# Patient Record
Sex: MALE | Race: WHITE | URBAN - METROPOLITAN AREA
[De-identification: names, ages, dates, MRNs, and addresses within clinical notes are randomized per-mention and may not be internally consistent; named-entity substitution may affect disease eponyms.]

---

## 2021-12-02 ENCOUNTER — APPOINTMENT (OUTPATIENT)
Dept: GENERAL RADIOLOGY | Age: 26
End: 2021-12-02
Payer: COMMERCIAL

## 2021-12-02 ENCOUNTER — HOSPITAL ENCOUNTER (EMERGENCY)
Age: 26
Discharge: HOME | End: 2021-12-02
Payer: COMMERCIAL

## 2021-12-02 VITALS
DIASTOLIC BLOOD PRESSURE: 67 MMHG | HEIGHT: 70 IN | BODY MASS INDEX: 21.11 KG/M2 | HEART RATE: 92 BPM | RESPIRATION RATE: 20 BRPM | OXYGEN SATURATION: 97 % | TEMPERATURE: 99.3 F | SYSTOLIC BLOOD PRESSURE: 144 MMHG | WEIGHT: 147.49 LBS

## 2021-12-02 DIAGNOSIS — S69.91XA RIGHT WRIST INJURY, INITIAL ENCOUNTER: Primary | ICD-10-CM

## 2021-12-02 PROCEDURE — 99283 EMERGENCY DEPT VISIT LOW MDM: CPT

## 2021-12-02 PROCEDURE — 73110 X-RAY EXAM OF WRIST: CPT

## 2021-12-02 RX ORDER — NAPROXEN 500 MG/1
500 TABLET ORAL 2 TIMES DAILY WITH MEALS
Qty: 30 TABLET | Refills: 0 | Status: SHIPPED | OUTPATIENT
Start: 2021-12-02 | End: 2022-03-07 | Stop reason: ALTCHOICE

## 2021-12-02 ASSESSMENT — PAIN DESCRIPTION - PAIN TYPE: TYPE: ACUTE PAIN

## 2021-12-02 ASSESSMENT — PAIN - FUNCTIONAL ASSESSMENT: PAIN_FUNCTIONAL_ASSESSMENT: 0-10

## 2021-12-02 ASSESSMENT — ENCOUNTER SYMPTOMS: GASTROINTESTINAL NEGATIVE: 1

## 2021-12-02 ASSESSMENT — PAIN DESCRIPTION - ORIENTATION
ORIENTATION: RIGHT
ORIENTATION: RIGHT

## 2021-12-02 ASSESSMENT — PAIN DESCRIPTION - FREQUENCY: FREQUENCY: CONTINUOUS

## 2021-12-02 ASSESSMENT — PAIN SCALES - GENERAL
PAINLEVEL_OUTOF10: 3
PAINLEVEL_OUTOF10: 6

## 2021-12-02 ASSESSMENT — PAIN DESCRIPTION - DESCRIPTORS
DESCRIPTORS: ACHING
DESCRIPTORS: ACHING;CONSTANT

## 2021-12-02 ASSESSMENT — PAIN DESCRIPTION - LOCATION
LOCATION: WRIST
LOCATION: WRIST

## 2021-12-02 NOTE — Clinical Note
Lilliana Zapata was seen and treated in our emergency department on 12/2/2021. He may return to work on 12/03/2021. No work involving her right hand for couple days or cleared by orthopedic/follow-up specialist.     If you have any questions or concerns, please don't hesitate to call.       [address] [place], [place]

## 2021-12-02 NOTE — Clinical Note
Ji De La Cruz was seen and treated in our emergency department on 12/2/2021. He may return to work on 12/03/2021. No work involving her right hand for couple days or cleared by orthopedic/follow-up specialist.     If you have any questions or concerns, please don't hesitate to call.       [address] [place], [place]

## 2021-12-03 NOTE — ED PROVIDER NOTES
[address]  [address] [address]  Dept: [phone]  Loc: [phone]  eMERGENCYdEPARTMENT eNCOUnter      Pt Name: Bautista Cruz  MRN: [id number]  Mary Jane 1995  Date of evaluation: 12/2/2021  Provider:Irene Arroyo PA-C    CHIEF COMPLAINT       Chief Complaint   Patient presents with    Wrist Injury     right; got pushed back between a work cart and a post this afternoon       Kailash Bhandari 124 time was 0 minutes, excluding separately reportable procedures. There was a high probability of clinically significant/life threatening deterioration in the patient's condition which required my urgentintervention. HISTORY OF PRESENT ILLNESS  (Location/Symptom, Timing/Onset, Context/Setting, Quality, Duration,Modifying Factors, Severity.)   Bautista Cruz is a 23 y.o. male who presents to the emergency department by private vehicle complaining of right wrist injury. Patient was at work when his right wrist got jammed while pushing a cart. He was pushing the cart and his elbow hit a pole. This caused his right wrist to hyperflex. Since then he has had pain throughout wrist. Pain worsens with movement of wrist. Denies any numbness or tingling to digits. Pain rated a 3/10 severity. Given injury sought evaluation in the ED. No other complaints at this time. He is right-hand dominant. Nursing Notes were reviewedand agreed with or any disagreements were addressed in the HPI. REVIEW OF SYSTEMS    (2-9 systems for level 4, 10 or more for level 5)     Review of Systems   Constitutional: Negative for chills and fever. HENT: Negative. Gastrointestinal: Negative. Genitourinary: Negative. Musculoskeletal: Positive for arthralgias. Skin: Negative. Neurological: Negative. Psychiatric/Behavioral: Negative for behavioral problems and confusion.        Except as noted above the remainder of the review of systems was reviewed and negative. PAST MEDICAL HISTORY   History reviewed. No pertinent past medical history. SURGICAL HISTORY     History reviewed. No pertinent surgical history. CURRENT MEDICATIONS     [unfilled]    ALLERGIES     Patient has no known allergies. FAMILY HISTORY     History reviewed. No pertinent family history. No family status information on file. SOCIAL HISTORY      reports that he has never smoked. He has never used smokeless tobacco. He reports that he does not drink alcohol. PHYSICAL EXAM    (up to 7 for level 4, 8 or more for level 5)     ED Triage Vitals [12/02/21 2028]   Enc Vitals Group      BP (!) 144/67      Pulse 92      Resp 20      Temp 99.3 °F (37.4 °C)      Temp Source Temporal      SpO2 97 %      Weight 147 lb 7.8 oz (66.9 kg)      Height 5' 10\" (1.778 m)      Head Circumference       Peak Flow       Pain Score       Pain Loc       Pain Edu? Excl. in [address]? Physical Exam  Vitals reviewed. Constitutional:       Appearance: Normal appearance. HENT:      Head: Normocephalic and atraumatic. Pulmonary:      Effort: Pulmonary effort is normal. No respiratory distress. Musculoskeletal:      Cervical back: Normal range of motion and neck supple. Comments: RUE: Tender to palpation along dorsal aspect of the right wrist. No focal bony tenderness throughout, no snuffbox tenderness. Pain with active flexion, flexion limited secondary to pain, mild pain with active extension. Full range of motion of all 5 digits, no weakness to digits throughout. Sensation tact distally, cap refill less than 3 seconds, compartments soft and nontender throughout. No other focal tenderness throughout extremity   Skin:     General: Skin is warm. Neurological:      General: No focal deficit present. Mental Status: He is alert and oriented to person, place, and time.    Psychiatric:         Mood and Affect: Mood normal.         Behavior: Behavior normal.           DIAGNOSTIC RESULTS     EKG: All EKG's are interpreted by the Emergency Department Physician who either signs or Co-signs this chart in the absence of a cardiologist.    RADIOLOGY:   Non-plain film images such as CT, Ultrasound and MRI are read by the radiologist. Plain radiographic images are visualized and preliminarilyinterpreted by the emergency physician with the below findings:    Interpretation per the Radiologist below,if available at the time of this note:    XR WRIST RIGHT (MIN 3 VIEWS)   Final Result   No acute osseous abnormality in the right wrist.               LABS:  Labs Reviewed - No data to display    All other labs were within normal range or not returned as of this dictation. EMERGENCY DEPARTMENT COURSE and DIFFERENTIAL DIAGNOSIS/MDM:   Vitals:    Vitals:    12/02/21 2028   BP: (!) 144/67   Pulse: 92   Resp: 20   Temp: 99.3 °F (37.4 °C)   TempSrc: Temporal   SpO2: 97%   Weight: 147 lb 7.8 oz (66.9 kg)   Height: 5' 10\" (1.778 m)       MDM     Patient presents ED with HPI noted above. X-ray obtained. X-ray showed no acute osseous abnormality. Suspect sprain. He is neurovascular tact distally. Patient placed in Velcro splint for support and comfort. He was provided [facility] also shows for work-related injury follow-up. He has prior note for work. Told to rest, ice, elevate and take naproxen as needed for pain inflammation. He is comfortable with plan. He was discharged home in stable condition. The patient tolerated their visit well. I saw the patient independently with physician available for consultation as needed. I have discussed the findings of today's workup with the patient and addressed the patient's questions and concerns. Important warning signs as well as new or worsening symptoms which would necessitate immediate return to the ED were discussed. The plan is to discharge from the ED at this time, and the patient is in stable condition.  The patient acknowledged understanding is agreeable with this plan. CONSULTS:  None    PROCEDURES:  Procedures    FINAL IMPRESSION      1. Right wrist injury, initial encounter          DISPOSITION/PLAN   [unfilled]    PATIENT REFERRED TO:  [facility] Emergency Department  [address] [address]  [phone]  Go to   If symptoms worsen    *[address] [place] [address] [address]  [phone]    Call in 1 day  For follow up and reevaluation regarding work related injury.       DISCHARGE MEDICATIONS:  Discharge Medication List as of 12/2/2021  9:01 PM      START taking these medications    Details   naproxen (NAPROSYN) 500 MG tablet Take 1 tablet by mouth 2 times daily (with meals), Disp-30 tablet, R-0Print             (Please note that portions of this note were completed with a voice recognition program.  Efforts were made to edit the dictations but occasionally words are mis-transcribed.)    TRI [place], [place]  12/02/21 [address] PA-C  12/02/21 [id number]

## 2022-03-07 ENCOUNTER — OFFICE VISIT (OUTPATIENT)
Dept: INTERNAL MEDICINE CLINIC | Age: 27
End: 2022-03-07
Payer: COMMERCIAL

## 2022-03-07 VITALS
OXYGEN SATURATION: 97 % | BODY MASS INDEX: 20.16 KG/M2 | DIASTOLIC BLOOD PRESSURE: 80 MMHG | SYSTOLIC BLOOD PRESSURE: 118 MMHG | HEART RATE: 130 BPM | TEMPERATURE: 98.5 F | WEIGHT: 140.5 LBS

## 2022-03-07 DIAGNOSIS — Z72.0 TOBACCO ABUSE: ICD-10-CM

## 2022-03-07 DIAGNOSIS — Z76.89 ENCOUNTER TO ESTABLISH CARE: Primary | ICD-10-CM

## 2022-03-07 DIAGNOSIS — Z00.00 HEALTHCARE MAINTENANCE: ICD-10-CM

## 2022-03-07 DIAGNOSIS — F39 MOOD DISORDER (HCC): ICD-10-CM

## 2022-03-07 DIAGNOSIS — Z23 FLU VACCINE NEED: ICD-10-CM

## 2022-03-07 LAB
A/G RATIO: 1.9 (ref 1.1–2.2)
ALBUMIN SERPL-MCNC: 5.4 G/DL (ref 3.4–5)
ALP BLD-CCNC: 57 U/L (ref 40–129)
ALT SERPL-CCNC: 13 U/L (ref 10–40)
ANION GAP SERPL CALCULATED.3IONS-SCNC: 14 MMOL/L (ref 3–16)
AST SERPL-CCNC: 12 U/L (ref 15–37)
BASOPHILS ABSOLUTE: 0 K/UL (ref 0–0.2)
BASOPHILS RELATIVE PERCENT: 0.4 %
BILIRUB SERPL-MCNC: 0.9 MG/DL (ref 0–1)
BUN BLDV-MCNC: 16 MG/DL (ref 7–20)
CALCIUM SERPL-MCNC: 10.5 MG/DL (ref 8.3–10.6)
CHLORIDE BLD-SCNC: 98 MMOL/L (ref 99–110)
CO2: 25 MMOL/L (ref 21–32)
CREAT SERPL-MCNC: 1 MG/DL (ref 0.9–1.3)
EOSINOPHILS ABSOLUTE: 0.1 K/UL (ref 0–0.6)
EOSINOPHILS RELATIVE PERCENT: 1 %
GFR AFRICAN AMERICAN: >60
GFR NON-AFRICAN AMERICAN: >60
GLUCOSE BLD-MCNC: 92 MG/DL (ref 70–99)
HCT VFR BLD CALC: 46.2 % (ref 40.5–52.5)
HEMOGLOBIN: 15.1 G/DL (ref 13.5–17.5)
HEPATITIS C ANTIBODY INTERPRETATION: NORMAL
LYMPHOCYTES ABSOLUTE: 1.7 K/UL (ref 1–5.1)
LYMPHOCYTES RELATIVE PERCENT: 33.3 %
MCH RBC QN AUTO: 30.4 PG (ref 26–34)
MCHC RBC AUTO-ENTMCNC: 32.8 G/DL (ref 31–36)
MCV RBC AUTO: 92.8 FL (ref 80–100)
MONOCYTES ABSOLUTE: 0.4 K/UL (ref 0–1.3)
MONOCYTES RELATIVE PERCENT: 8.3 %
NEUTROPHILS ABSOLUTE: 3 K/UL (ref 1.7–7.7)
NEUTROPHILS RELATIVE PERCENT: 57 %
PDW BLD-RTO: 13.6 % (ref 12.4–15.4)
PLATELET # BLD: 219 K/UL (ref 135–450)
PMV BLD AUTO: 8.9 FL (ref 5–10.5)
POTASSIUM SERPL-SCNC: 5.5 MMOL/L (ref 3.5–5.1)
RBC # BLD: 4.98 M/UL (ref 4.2–5.9)
SODIUM BLD-SCNC: 137 MMOL/L (ref 136–145)
TOTAL PROTEIN: 8.2 G/DL (ref 6.4–8.2)
TSH REFLEX: 1.22 UIU/ML (ref 0.27–4.2)
WBC # BLD: 5.3 K/UL (ref 4–11)

## 2022-03-07 PROCEDURE — G8420 CALC BMI NORM PARAMETERS: HCPCS | Performed by: NURSE PRACTITIONER

## 2022-03-07 PROCEDURE — 90674 CCIIV4 VAC NO PRSV 0.5 ML IM: CPT | Performed by: NURSE PRACTITIONER

## 2022-03-07 PROCEDURE — G8482 FLU IMMUNIZE ORDER/ADMIN: HCPCS | Performed by: NURSE PRACTITIONER

## 2022-03-07 PROCEDURE — 99203 OFFICE O/P NEW LOW 30 MIN: CPT | Performed by: NURSE PRACTITIONER

## 2022-03-07 PROCEDURE — G8427 DOCREV CUR MEDS BY ELIG CLIN: HCPCS | Performed by: NURSE PRACTITIONER

## 2022-03-07 PROCEDURE — 4004F PT TOBACCO SCREEN RCVD TLK: CPT | Performed by: NURSE PRACTITIONER

## 2022-03-07 PROCEDURE — 36415 COLL VENOUS BLD VENIPUNCTURE: CPT | Performed by: NURSE PRACTITIONER

## 2022-03-07 ASSESSMENT — PATIENT HEALTH QUESTIONNAIRE - PHQ9
1. LITTLE INTEREST OR PLEASURE IN DOING THINGS: 1
2. FEELING DOWN, DEPRESSED OR HOPELESS: 0
SUM OF ALL RESPONSES TO PHQ QUESTIONS 1-9: 1
SUM OF ALL RESPONSES TO PHQ9 QUESTIONS 1 & 2: 1

## 2022-03-07 ASSESSMENT — ENCOUNTER SYMPTOMS
COUGH: 0
SHORTNESS OF BREATH: 0

## 2022-03-07 NOTE — PROGRESS NOTES
Date: 3/7/2022                                               Subjective/Objective:     Chief Complaint   Patient presents with   [place] Establish Care     Would like [facility] to [facility]     Lalo Candelario is a 32 yo male, visit today to establish care. No recent PCP. Patient is requesting referral for psychiatry today. Reports that lately he has had issues with stability. He reports that he has a \" really good week,\" meaning he is very productive and then he will have 1 bad day that there is everything off. Reports he was working at [employer] in [facility]. He lost his job indirectly related to his mental health. In regards to his sleep he says that he \"enjoys spells of thorough rest in between sleep is hit and miss. \"  He reports that he was previously treated for mental health issues. He is not clear on his history of this, reports he \"was not told much. \"  He believes he may have been diagnosed as bipolar, ADHD. He does have 1 psychiatric visit visible in [address] with a noted history of depression. He denies SI currently. He tells me that \"mortality weighs heavy on a oziel he thinks about death ,\" however again he denies thoughts of harming himself, denies homicidal thoughts. He reports that as a child he would have periods where he would stop eating and drinking liquids. Denies other suicide attempts. He is not sure if he has had visual or auditory hallucinations. He smokes marijuana few times per week, denies other drug use. He drinks alcohol 3-4 times per year. He tells me that he does have guns in his home. Smokes cigarettes some days, anywhere from 3-1/2 pack when he does. Is not ready to quit. There are no problems to display for this patient.       Past Medical History:   Diagnosis Date    Mental health disorder        Past Surgical History:   Procedure Laterality Date    OTHER SURGICAL HISTORY      in grown nail removal       No results found for any previous visit. Family History   Problem Relation Age of Onset    Bipolar Disorder Father     ADHD Brother        No current outpatient medications on file. No current facility-administered medications for this visit. No Known Allergies    Review of Systems   Constitutional: Negative for chills and fever. Respiratory: Negative for cough and shortness of breath. Cardiovascular: Negative for chest pain and leg swelling. Genitourinary: Negative for difficulty urinating. Neurological: Negative for dizziness and syncope. Psychiatric/Behavioral: Positive for dysphoric mood and sleep disturbance. Negative for agitation and suicidal ideas. The patient is not nervous/anxious. All other systems reviewed and are negative. Vitals:  /80   Pulse 130   Temp 98.5 °F (36.9 °C) (Oral)   Wt 140 lb 8 oz (63.7 kg)   SpO2 97%   BMI 20.16 kg/m²     Physical Exam  Vitals reviewed. Constitutional:       General: He is not in acute distress. Appearance: Normal appearance. HENT:      Head: Normocephalic and atraumatic. Right Ear: Tympanic membrane, ear canal and external ear normal.      Left Ear: Tympanic membrane, ear canal and external ear normal.   Cardiovascular:      Rate and Rhythm: Normal rate and regular rhythm. Pulses: Normal pulses. Heart sounds: Normal heart sounds. Pulmonary:      Effort: Pulmonary effort is normal.      Breath sounds: Normal breath sounds. Abdominal:      General: Bowel sounds are normal.      Palpations: Abdomen is soft. Tenderness: There is no abdominal tenderness. Lymphadenopathy:      Cervical: No cervical adenopathy. Skin:     General: Skin is warm and dry. Neurological:      General: No focal deficit present. Mental Status: He is alert and oriented to person, place, and time. Psychiatric:         Mood and Affect: Mood is not depressed. Thought Content:  Thought content does not include homicidal or suicidal ideation. Judgment: Judgment normal.         Assessment/Plan     1. Encounter to establish care    2. Mood disorder [facility]): not currently treated  - External Referral to Psychiatry  - External Referral To Psychology   -Denies SI   -Reviewed with patient if he ever were to have SI he needs to call 911 immediately   -He was strongly encouraged to have guns removed from his home. I asked if I could call someone to have them removed and he declined for me to do so. -See psychiatry and psychology    3. Tobacco abuse: Cessation strongly advised. Patient not ready to quit. 4. Flu vaccine need  - INFLUENZA, MDCK QUADV, 2 YRS AND OLDER, IM, PF, PREFILL SYR OR SDV, 0.5ML (FLUCELVAX QUADV, PF)    5. Healthcare maintenance  -Age-appropriate health screening, patient agreeable to HIV and hepatitis C testing  - Comprehensive Metabolic Panel  - CBC with Auto Differential  - TSH with Reflex; Future  - HIV Screen; Future  - Hepatitis C Antibody;  Future      Orders Placed This Encounter   Procedures    INFLUENZA, MDCK QUADV, 2 YRS AND OLDER, IM, PF, PREFILL SYR OR SDV, 0.5ML (FLUCELVAX QUADV, PF)    Comprehensive Metabolic Panel    CBC with Auto Differential    TSH with Reflex     Standing Status:   Future     Number of Occurrences:   1     Standing Expiration Date:   3/7/2023    HIV Screen     Standing Status:   Future     Number of Occurrences:   1     Standing Expiration Date:   3/7/2023    Hepatitis C Antibody     Standing Status:   Future     Number of Occurrences:   1     Standing Expiration Date:   3/7/2023    External Referral to Psychiatry     Referral Priority:   Routine     Referral Type:   Eval and Treat     Referral Reason:   Specialty Services Required     Requested Specialty:   Psychiatry     Number of Visits Requested:   1    External Referral To Psychology     Referral Priority:   Routine     Referral Type:   Eval and Treat     Referral Reason:   Specialty Services Required Requested Specialty:   Psychology     Number of Visits Requested:   1       No follow-ups on file. OR sooner with questions, concerns, worsening symptoms    OSCAR CORDOVA, NP    3/7/2022  3:37 PM    Discussed use, benefit, and side effects of prescribed medications. Barriers to medication compliance addressed. Discussed all ordered testing and labs. All patient questions answered. Patient agreeable with plan above. Please note that this chart was generated using dragon dictation software. Although every effort was made to ensure the accuracy of this automated transcription, some errors in transcription may have occurred.

## 2022-03-08 DIAGNOSIS — E87.5 HYPERKALEMIA: Primary | ICD-10-CM

## 2022-03-08 LAB
HIV AG/AB: NORMAL
HIV ANTIGEN: NORMAL
HIV-1 ANTIBODY: NORMAL
HIV-2 AB: NORMAL

## 2022-03-18 ENCOUNTER — OFFICE VISIT (OUTPATIENT)
Dept: PSYCHOLOGY | Age: 27
End: 2022-03-18
Payer: COMMERCIAL

## 2022-03-18 DIAGNOSIS — F33.1 MAJOR DEPRESSIVE DISORDER, RECURRENT EPISODE, MODERATE (HCC): ICD-10-CM

## 2022-03-18 DIAGNOSIS — F34.9 PERSISTENT MOOD (AFFECTIVE) DISORDER, UNSPECIFIED (HCC): Primary | ICD-10-CM

## 2022-03-18 PROCEDURE — 90791 PSYCH DIAGNOSTIC EVALUATION: CPT | Performed by: PSYCHOLOGIST

## 2022-03-18 PROCEDURE — 4004F PT TOBACCO SCREEN RCVD TLK: CPT | Performed by: PSYCHOLOGIST

## 2022-03-18 ASSESSMENT — PATIENT HEALTH QUESTIONNAIRE - PHQ9
1. LITTLE INTEREST OR PLEASURE IN DOING THINGS: 3
SUM OF ALL RESPONSES TO PHQ QUESTIONS 1-9: 18
6. FEELING BAD ABOUT YOURSELF - OR THAT YOU ARE A FAILURE OR HAVE LET YOURSELF OR YOUR FAMILY DOWN: 0
9. THOUGHTS THAT YOU WOULD BE BETTER OFF DEAD, OR OF HURTING YOURSELF: 0
7. TROUBLE CONCENTRATING ON THINGS, SUCH AS READING THE NEWSPAPER OR WATCHING TELEVISION: 2
SUM OF ALL RESPONSES TO PHQ9 QUESTIONS 1 & 2: 5
3. TROUBLE FALLING OR STAYING ASLEEP: 3
5. POOR APPETITE OR OVEREATING: 3
2. FEELING DOWN, DEPRESSED OR HOPELESS: 2
SUM OF ALL RESPONSES TO PHQ QUESTIONS 1-9: 18
8. MOVING OR SPEAKING SO SLOWLY THAT OTHER PEOPLE COULD HAVE NOTICED. OR THE OPPOSITE, BEING SO FIGETY OR RESTLESS THAT YOU HAVE BEEN MOVING AROUND A LOT MORE THAN USUAL: 2
4. FEELING TIRED OR HAVING LITTLE ENERGY: 3

## 2022-03-18 ASSESSMENT — ANXIETY QUESTIONNAIRES
3. WORRYING TOO MUCH ABOUT DIFFERENT THINGS: 1-SEVERAL DAYS
6. BECOMING EASILY ANNOYED OR IRRITABLE: 3-NEARLY EVERY DAY
1. FEELING NERVOUS, ANXIOUS, OR ON EDGE: 3-NEARLY EVERY DAY
GAD7 TOTAL SCORE: 13
4. TROUBLE RELAXING: 3-NEARLY EVERY DAY
2. NOT BEING ABLE TO STOP OR CONTROL WORRYING: 1-SEVERAL DAYS
5. BEING SO RESTLESS THAT IT IS HARD TO SIT STILL: 2-OVER HALF THE DAYS
7. FEELING AFRAID AS IF SOMETHING AWFUL MIGHT HAPPEN: 0-NOT AT ALL

## 2022-03-18 NOTE — PROGRESS NOTES
Behavioral Health Consultation  Cesar Mesa, Ph.D.  Psychologist  3/18/2022  2:30 PM EDT      Time spent with Patient: 30 minutes  This is patient's first [facility] appointment. Reason for Consult: depression  Referring Provider: Sal Lewis, [address] [facility] [address] [facility] [address]    Feedback for PCP:     Pt provided informed consent for the behavioral health program. Discussed with patient model of service to include the limits of confidentiality (i.e. abuse reporting, suicide intervention, etc.) and short-term intervention focused approach. Pt indicated understanding. Feedback given to PCP. S:  When asked how he was feeling about having this visit, he said that he was [place] Older" that the people who care about him wants him to get help. He said, \"What I feel does not feel like talk therapy can fix. \" He said, \"I'm visibly depressed and they say I get in my head too much, and that I isolate. \" He said that the last job he had was in [employer] but that he was discharged after a year. 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 He said he got a \"Dear Domingo\" letter while in [employer] as well. When asked what he saw himself doing in 5 years, he said \"blank,\" and gave the same answer when asked about in one month. He did say though, that \"I would like a mate and children. \"    SI/HI: reports a suicide attempt by discontinuing \"eating and drinking. \"  Mental health history:     Social History     Tobacco Use    Smoking status: Current Some Day Smoker    Smokeless tobacco: Never Used   Substance Use Topics    Alcohol use: Yes        Illicit drugs:   Social History     Substance and Sexual Activity   Drug Use Yes    Types: Marijuana Gelene Chasten)        O:  MSE:  Appearance: good hygiene   Attitude: cooperative and friendly  Consciousness: alert  Orientation: oriented to person, place, time, general circumstance  Memory: recent and remote memory intact  Attention/Concentration: intact during session  Psychomotor Activity: normal  Eye Contact: normal  Speech: rapid and pressured  Mood: seemed hypomanic, but positive mood  Affect: congruent  Perception: within normal limits  Thought Content: within normal limits  Thought Process: logical, coherent and goal-directed  Insight: good  Judgment: intact  Ability to understand instructions: Yes  Ability to respond meaningfully: Yes  Morbid Ideation: no   Suicide Assessment: suicidal ideation without plan or intent  Homicidal Ideation: no    A:  Still collecting clinical information with which to make a behavioral change plan. He was asked if it wasn't his idea to be here, it's not likely that this would be a good use of time for him or me. He was told that I would be happy to work with him, and when asked what he would like to do, he said, \"You passed my test.\" He did not say anything more about what the test was comprised of, but he said that he would make a F/U appointment. ALEJO 7 SCORE 3/18/2022   ALEJO-7 Total Score 13     Interpretation of ALEJO-7 score: 5-9 = mild anxiety, 10-14 = moderate anxiety, 15+ = severe anxiety. Recommend referral to behavioral health for scores 10 or greater. PHQ Scores 3/18/2022 3/7/2022   PHQ2 Score 5 1   PHQ9 Score 18 1     Interpretation of Total Score Depression Severity: 1-4 = Minimal depression, 5-9 = Mild depression, 10-14 = Moderate depression, 15-19 = Moderately severe depression, 20-27 = Severe depression    Diagnosis:    1. Persistent mood (affective) disorder, unspecified ([facility] [address]    2. Major depressive disorder, recurrent episode, moderate (HCC)        There is no problem list on file for this patient. Plan:  Pt interventions:  Established rapport, [place]-setting to identify pt's primary goals for [facility] visit / overall health, Supportive techniques and Provided Psychoeducation re: treatment of depression.        Pt Behavioral Change Plan:  Pt set the following goals:  Pt scheduled F/U visit in three weeks

## 2022-04-08 ENCOUNTER — OFFICE VISIT (OUTPATIENT)
Dept: PSYCHOLOGY | Age: 27
End: 2022-04-08
Payer: COMMERCIAL

## 2022-04-08 DIAGNOSIS — F33.2 SEVERE EPISODE OF RECURRENT MAJOR DEPRESSIVE DISORDER, WITHOUT PSYCHOTIC FEATURES (HCC): Primary | ICD-10-CM

## 2022-04-08 PROCEDURE — 4004F PT TOBACCO SCREEN RCVD TLK: CPT | Performed by: PSYCHOLOGIST

## 2022-04-08 PROCEDURE — 90832 PSYTX W PT 30 MINUTES: CPT | Performed by: PSYCHOLOGIST

## 2022-04-08 ASSESSMENT — ANXIETY QUESTIONNAIRES
6. BECOMING EASILY ANNOYED OR IRRITABLE: 3-NEARLY EVERY DAY
GAD7 TOTAL SCORE: 15
2. NOT BEING ABLE TO STOP OR CONTROL WORRYING: 1-SEVERAL DAYS
7. FEELING AFRAID AS IF SOMETHING AWFUL MIGHT HAPPEN: 1-SEVERAL DAYS
3. WORRYING TOO MUCH ABOUT DIFFERENT THINGS: 2-OVER HALF THE DAYS
5. BEING SO RESTLESS THAT IT IS HARD TO SIT STILL: 2-OVER HALF THE DAYS
4. TROUBLE RELAXING: 3-NEARLY EVERY DAY
1. FEELING NERVOUS, ANXIOUS, OR ON EDGE: 3-NEARLY EVERY DAY

## 2022-04-08 ASSESSMENT — PATIENT HEALTH QUESTIONNAIRE - PHQ9
SUM OF ALL RESPONSES TO PHQ QUESTIONS 1-9: 19
3. TROUBLE FALLING OR STAYING ASLEEP: 3
4. FEELING TIRED OR HAVING LITTLE ENERGY: 3
9. THOUGHTS THAT YOU WOULD BE BETTER OFF DEAD, OR OF HURTING YOURSELF: 0
8. MOVING OR SPEAKING SO SLOWLY THAT OTHER PEOPLE COULD HAVE NOTICED. OR THE OPPOSITE, BEING SO FIGETY OR RESTLESS THAT YOU HAVE BEEN MOVING AROUND A LOT MORE THAN USUAL: 1
SUM OF ALL RESPONSES TO PHQ QUESTIONS 1-9: 19
SUM OF ALL RESPONSES TO PHQ QUESTIONS 1-9: 19
6. FEELING BAD ABOUT YOURSELF - OR THAT YOU ARE A FAILURE OR HAVE LET YOURSELF OR YOUR FAMILY DOWN: 1
5. POOR APPETITE OR OVEREATING: 3
SUM OF ALL RESPONSES TO PHQ QUESTIONS 1-9: 19
2. FEELING DOWN, DEPRESSED OR HOPELESS: 2
7. TROUBLE CONCENTRATING ON THINGS, SUCH AS READING THE NEWSPAPER OR WATCHING TELEVISION: 3
1. LITTLE INTEREST OR PLEASURE IN DOING THINGS: 3
SUM OF ALL RESPONSES TO PHQ9 QUESTIONS 1 & 2: 5
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
## 2022-04-15 ENCOUNTER — OFFICE VISIT (OUTPATIENT)
Dept: PSYCHOLOGY | Age: 27
End: 2022-04-15
Payer: COMMERCIAL

## 2022-04-15 DIAGNOSIS — F33.2 SEVERE EPISODE OF RECURRENT MAJOR DEPRESSIVE DISORDER, WITHOUT PSYCHOTIC FEATURES (HCC): Primary | ICD-10-CM

## 2022-04-15 PROCEDURE — 4004F PT TOBACCO SCREEN RCVD TLK: CPT | Performed by: PSYCHOLOGIST

## 2022-04-15 PROCEDURE — 90832 PSYTX W PT 30 MINUTES: CPT | Performed by: PSYCHOLOGIST

## 2022-04-15 ASSESSMENT — PATIENT HEALTH QUESTIONNAIRE - PHQ9
SUM OF ALL RESPONSES TO PHQ QUESTIONS 1-9: 16
SUM OF ALL RESPONSES TO PHQ9 QUESTIONS 1 & 2: 5
2. FEELING DOWN, DEPRESSED OR HOPELESS: 2
4. FEELING TIRED OR HAVING LITTLE ENERGY: 2
SUM OF ALL RESPONSES TO PHQ QUESTIONS 1-9: 16
5. POOR APPETITE OR OVEREATING: 3
6. FEELING BAD ABOUT YOURSELF - OR THAT YOU ARE A FAILURE OR HAVE LET YOURSELF OR YOUR FAMILY DOWN: 1
SUM OF ALL RESPONSES TO PHQ QUESTIONS 1-9: 16
SUM OF ALL RESPONSES TO PHQ QUESTIONS 1-9: 16
9. THOUGHTS THAT YOU WOULD BE BETTER OFF DEAD, OR OF HURTING YOURSELF: 0
1. LITTLE INTEREST OR PLEASURE IN DOING THINGS: 3
7. TROUBLE CONCENTRATING ON THINGS, SUCH AS READING THE NEWSPAPER OR WATCHING TELEVISION: 2
3. TROUBLE FALLING OR STAYING ASLEEP: 3
8. MOVING OR SPEAKING SO SLOWLY THAT OTHER PEOPLE COULD HAVE NOTICED. OR THE OPPOSITE, BEING SO FIGETY OR RESTLESS THAT YOU HAVE BEEN MOVING AROUND A LOT MORE THAN USUAL: 0

## 2022-04-15 NOTE — PROGRESS NOTES
Behavioral Health Consultation  Candace Haij, Ph.D.  Psychologist  4/15/2022  11:30 AM EDT      Time spent with Patient: 30 minutes  This is patient's third [facility] appointment. Reason for Consult: depression  Referring Provider: Terrence Chamorro, [address] [facility] [address] [place] [id number]    Feedback for PCP:     Pt provided informed consent for the behavioral health program. Discussed with patient model of service to include the limits of confidentiality (i.e. abuse reporting, suicide intervention, etc.) and short-term intervention focused approach. Pt indicated understanding. Feedback given to PCP. S:  The patient reports \"I've been on the up for several days. \" He said that the relief has been \"physiologically relieving. \" When asked what 'up' was referring to, he said that his \"demeanor\" is more positive. He talked about how the concept of radical acceptance might help dilute the influence of the \"pool of toxicity. \" He described how his circadian rhythm shifts from time to time, which adds to his anxiety at times. SI/HI: reports a suicide attempt by discontinuing \"eating and drinking. \"  Mental health history:     Social History     Tobacco Use    Smoking status: Current Some Day Smoker    Smokeless tobacco: Never Used   Substance Use Topics    Alcohol use: Yes        Illicit drugs:   Social History     Substance and Sexual Activity   Drug Use Yes    Types: Marijuana Nicolas Beck)        O:  MSE:  Appearance: good hygiene   Attitude: cooperative and friendly  Consciousness: alert  Orientation: oriented to person, place, time, general circumstance  Memory: recent and remote memory intact  Attention/Concentration: intact during session  Psychomotor Activity: normal  Eye Contact: normal  Speech: rapid and pressured  Mood: less anxious, generally positive mood  Affect: congruent  Perception: within normal limits  Thought Content: within normal limits  Thought Process: logical, coherent and goal-directed  Insight: good  Judgment: intact  Ability to understand instructions: Yes  Ability to respond meaningfully: Yes  Morbid Ideation: no   Suicide Assessment: suicidal ideation without plan or intent  Homicidal Ideation: no    A:  We talked about how he has chosen to be very intentional about changing his patterns that have produced so much negative feelings and paucity of positive feelings. We talked about the concept of grounding to reinforce the sense of here-and-now, and how being present makes it more difficult to feel anxiety and worry. We talked about strategies to disrupt rumination episodes. We talked about how the deep breathing can help with being present. ALEJO 7 SCORE 4/8/2022 3/18/2022   ALEJO-7 Total Score 15 13     Interpretation of ALEJO-7 score: 5-9 = mild anxiety, 10-14 = moderate anxiety, 15+ = severe anxiety. Recommend referral to behavioral health for scores 10 or greater. PHQ Scores 4/15/2022 4/8/2022 3/18/2022 3/7/2022   PHQ2 Score 5 5 5 1   PHQ9 Score 16 19 18 1     Interpretation of Total Score Depression Severity: 1-4 = Minimal depression, 5-9 = Mild depression, 10-14 = Moderate depression, 15-19 = Moderately severe depression, 20-27 = Severe depression    Diagnosis:    1. Severe episode of recurrent major depressive disorder, without psychotic features ([facility] [address]        There is no problem list on file for this patient. Plan:  Pt interventions:  Established rapport, [place]-setting to identify pt's primary goals for [facility] visit / overall health, Supportive techniques and Provided Psychoeducation re: treatment of depression.        Pt Behavioral Change Plan:  Pt set the following goals:  Pt scheduled F/U visit in one week  Pt will see OSCAR Koenig, [place] on 5/5

## 2022-04-29 ENCOUNTER — OFFICE VISIT (OUTPATIENT)
Dept: PSYCHOLOGY | Age: 27
End: 2022-04-29
Payer: COMMERCIAL

## 2022-04-29 DIAGNOSIS — F33.1 MAJOR DEPRESSIVE DISORDER, RECURRENT EPISODE, MODERATE (HCC): Primary | ICD-10-CM

## 2022-04-29 PROCEDURE — 90832 PSYTX W PT 30 MINUTES: CPT | Performed by: PSYCHOLOGIST

## 2022-04-29 PROCEDURE — 4004F PT TOBACCO SCREEN RCVD TLK: CPT | Performed by: PSYCHOLOGIST

## 2022-04-29 ASSESSMENT — ANXIETY QUESTIONNAIRES
3. WORRYING TOO MUCH ABOUT DIFFERENT THINGS: 1-SEVERAL DAYS
6. BECOMING EASILY ANNOYED OR IRRITABLE: 1-SEVERAL DAYS
2. NOT BEING ABLE TO STOP OR CONTROL WORRYING: 1-SEVERAL DAYS
7. FEELING AFRAID AS IF SOMETHING AWFUL MIGHT HAPPEN: 0-NOT AT ALL
GAD7 TOTAL SCORE: 6
4. TROUBLE RELAXING: 1-SEVERAL DAYS
5. BEING SO RESTLESS THAT IT IS HARD TO SIT STILL: 1-SEVERAL DAYS
1. FEELING NERVOUS, ANXIOUS, OR ON EDGE: 1

## 2022-04-29 ASSESSMENT — PATIENT HEALTH QUESTIONNAIRE - PHQ9
4. FEELING TIRED OR HAVING LITTLE ENERGY: 1
7. TROUBLE CONCENTRATING ON THINGS, SUCH AS READING THE NEWSPAPER OR WATCHING TELEVISION: 2
SUM OF ALL RESPONSES TO PHQ9 QUESTIONS 1 & 2: 3
3. TROUBLE FALLING OR STAYING ASLEEP: 2
SUM OF ALL RESPONSES TO PHQ QUESTIONS 1-9: 11
5. POOR APPETITE OR OVEREATING: 2
2. FEELING DOWN, DEPRESSED OR HOPELESS: 1
1. LITTLE INTEREST OR PLEASURE IN DOING THINGS: 2
6. FEELING BAD ABOUT YOURSELF - OR THAT YOU ARE A FAILURE OR HAVE LET YOURSELF OR YOUR FAMILY DOWN: 0
8. MOVING OR SPEAKING SO SLOWLY THAT OTHER PEOPLE COULD HAVE NOTICED. OR THE OPPOSITE, BEING SO FIGETY OR RESTLESS THAT YOU HAVE BEEN MOVING AROUND A LOT MORE THAN USUAL: 1
SUM OF ALL RESPONSES TO PHQ QUESTIONS 1-9: 11
SUM OF ALL RESPONSES TO PHQ QUESTIONS 1-9: 11
9. THOUGHTS THAT YOU WOULD BE BETTER OFF DEAD, OR OF HURTING YOURSELF: 0
SUM OF ALL RESPONSES TO PHQ QUESTIONS 1-9: 11

## 2022-04-29 NOTE — PROGRESS NOTES
Behavioral Health Consultation  Carloz Hoover, Ph.D.  Psychologist  4/29/2022  11:30 AM EDT      Time spent with Patient: 30 minutes  This is patient's third [facility] appointment. Reason for Consult: depression  Referring Provider: Analy Dickson, [address] [facility] [address] [place] [id number]    Feedback for PCP:     Pt provided informed consent for the behavioral health program. Discussed with patient model of service to include the limits of confidentiality (i.e. abuse reporting, suicide intervention, etc.) and short-term intervention focused approach. Pt indicated understanding. Feedback given to PCP. S:  The patient reports that his mood, outlook continue to trend up, and that \"I'm getting ansty for these changes to be reflected more braodly. \" When asked how he would like to see them reflected, he said, \"just the capacity to get out of bed. \" He says that when he awakens, he has a sense of \"low level dread, as in here we go again. \" He says then he runs through the day's 'to-do's, and then shifts to what he may want to do. He said that \"then the things I want to do are turned into grueling shoulds and 'have to's' which ruins them. \" He said, \" I want to get the launch right. \"       SI/HI: reports a suicide attempt by discontinuing \"eating and drinking. \"  Mental health history:     Social History     Tobacco Use    Smoking status: Current Some Day Smoker    Smokeless tobacco: Never Used   Substance Use Topics    Alcohol use: Yes        Illicit drugs:   Social History     Substance and Sexual Activity   Drug Use Yes    Types: Marijuana Dauna Other)        O:  MSE:  Appearance: good hygiene   Attitude: cooperative and friendly  Consciousness: alert  Orientation: oriented to person, place, time, general circumstance  Memory: recent and remote memory intact  Attention/Concentration: intact during session  Psychomotor Activity: normal  Eye Contact: normal  Speech: rapid and pressured  Mood: less anxious, generally positive mood  Affect: congruent  Perception: within normal limits  Thought Content: within normal limits  Thought Process: logical, coherent and goal-directed  Insight: good  Judgment: intact  Ability to understand instructions: Yes  Ability to respond meaningfully: Yes  Morbid Ideation: no   Suicide Assessment: suicidal ideation without plan or intent  Homicidal Ideation: no    A:  We talked about how to get the \"launch right,\" and the possible benefit of mindfulness, intention, and positive expectancy. He seems to think that if he can stabilize the transition from sleep to wakefulness, this will reflect the positive changes in his mood. ALEJO 7 SCORE 4/29/2022 4/8/2022 3/18/2022   ALEJO-7 Total Score 6 15 13     Interpretation of ALEJO-7 score: 5-9 = mild anxiety, 10-14 = moderate anxiety, 15+ = severe anxiety. Recommend referral to behavioral health for scores 10 or greater. PHQ Scores 4/29/2022 4/15/2022 4/8/2022 3/18/2022 3/7/2022   PHQ2 Score 3 5 5 5 1   PHQ9 Score 11 16 19 18 1     Interpretation of Total Score Depression Severity: 1-4 = Minimal depression, 5-9 = Mild depression, 10-14 = Moderate depression, 15-19 = Moderately severe depression, 20-27 = Severe depression    Diagnosis:    1. Major depressive disorder, recurrent episode, moderate (HCC)        There is no problem list on file for this patient. Plan:  Pt interventions:  Established rapport, [place]-setting to identify pt's primary goals for [facility] visit / overall health, Supportive techniques and Provided Psychoeducation re: treatment of depression.        Pt Behavioral Change Plan:  Pt set the following goals:  Pt scheduled F/U visit in two weeks  Pt will see OSCAR Urrutia, CNP on 5/5

## 2022-04-29 NOTE — PATIENT INSTRUCTIONS
The [address] [facility]) Consultant giving you this message provides team-based care to treat the mind and body. He works directly with your doctor, who will always stay in charge of your care. Most [address] visits are 30 minutes or less. Usually, the [address] [place] [address] provider and your doctor continue to see you until you are starting to do better and have a good plan in place for continued progress. Once that happens, most patients follow-up with just their doctor (though the [address] [place] [address] provider remains available to you as needed). If you are not improving, or if you desire outside mental health treatment, or if your doctor recommends more specialized help, we will be happy to help you find a mental health specialist in the community. Please also note your health insurance may be billed for [address] visits. Check with your insurance company, and tell the [address] [place] [address] provider, if you have any questions about your [address] [place] [address] coverage. How to Develop Your Own Mindfulness Meditation Practice    There are some key points that you may want to consider to increase the likelihood of success in enjoying your mindfulness meditation practice. 1. Start Small  Many of us think it is productive to set ambitious goals, like meditating for one hour a day. However, I suggest you start small and gradually increase the amount of time you meditate. Five minutes a day is an excellent target for beginners. Why? Do not be tempted to push yourself too hard and accept that even a few minutes of meditation each day are enough to make tangible progress. The point is to opt for consistency over quantity. Start small. 2. Choose a Peaceful Place and a Comfortable Position  You can practice mindfulness in any situation simply by using your five senses to observe yourself and the world around you.  Nevertheless, when you are just getting started with your practice, it's helpful to find a peaceful environment to meditate so you can avoid interruptions. One of the best ways to create optimal conditions is to wake up early in the morning and meditate while everyone else is asleep. Most people prefer to sit, as they tend to fall asleep during meditation if they lie on their couch or bed. Avoid poor posture, which creates physical tension and even mental stress, and sit comfortably. There is no need to force yourself to sit in a keo position. Sitting on a chair works just fine! 3. Meditate at the Same Time Every Day  You need to find that time of the day when you can meditate without distractions and stick to it. There is a reason for this. Any habit is made up of a trigger, an action, and a reward. A trigger is something that reminds you of and leads you to an action. For example, if you meditate every day at 7 a.m., your brain will associate that time with the act of meditating. In other words, 7 a.m. itself can trigger you to meditate. Chances are, though, that whatever you normally do before meditation in your morning routine will act as a trigger as well. 4. Set a Timer  The purpose of setting a timer when you start meditating is to prevent yourself from worrying about how long you have been meditating while you are meditating. If you don't set a timer, you may feel tempted to check the clock during your practice. In contrast, setting one will allow you to track your progress with the duration of your meditations. As a beginner, I meditated for 5 minutes. Soon those 5 minutes became 10. Then 15. Next, 15 turned into 20. I would not have been aware of such progress had I not relied on a timer. 5. Don't Try to Control Your Breath  Deep diaphragmatic breathing is ideal to turn off the fight-or-flight response and relax. Dedicating some time to learning how to breathe like this can help you reduce stress, stress eating, and emotional eating. However, when you meditate there is no obligation to control the way you are breathing.  All you need to do is to observe it objectively. Feel how the air enters your body, moves inside it and then exits it. Which part of your lungs does it fill up? Is it the bottom or the upper part? Does it fill it up quickly or slowly? Is the air cold or warm? 6. Embrace the Fact that Your Mind Will Wander  The purpose of meditation is not to stop thinking but to increase awareness and presence. Therefore, when your mind starts to wander, which is totally normal and predictable, be curious and pay attention to your stream of consciousness as if it were being broadcast on television. There is no need to turn that off. When you are done observing your thoughts, go back to focus on your breath or whatever object of attention you have chosen. If you can do this even only once during your meditation, rest assured it was a productive session, no matter how crazy your mind went. Whatever happens during your meditation is perfect as it is. Noticing that your mind has wandered and dropping your thought tangents to refocus on the here and now is the real activity of meditating. That is to say: if you notice your mind wandering, you're doing it right. 7. If You Struggle, Consider Guided Meditations  When you are a beginner, you might feel uncertain and catch yourself regularly worrying about what you should be doing. In that case, guided meditations can be quite helpful. There are great apps and free resources all over the Internet. There are even meditation coaches who can guide you through the process on your phone. The point is not to give up if you struggle. Seek guidance and you shall find it. 8. Use Urges to Eat as Opportunities to [employer]  If you are an emotional eater, you probably experience urges to eat when you are not hungry. These urges are espinoza opportunities to practice mindfulness. As soon as you perceive an urge, try to stop for a second before acting on it straight away.  Sit for a minute and observe the urge. Look at this urge as if a part of your brain were producing it automatically. This urge isn't really what you want to do; it is just a byproduct of a habit. Where do you feel the urge in your body? If there is a specific place, pay attention to that part of your body with curiosity. Is the urge making any statements? These statements may sound like this:    I need to eat right now!   Screw it! I can eat whatever I want because yesterday I went to the gym.    If I don't have that piece of cake, I'll die!   The only thing you need to do is to observe the urge's thoughts. You do not need to  them or fight them--just let them be. You might end up identifying with these statements, but remember that these are just pathways your brain has created over time. They are not objective ideas we can describe as right or wrong, so simply stay there and be an observer. When you do this, you use your prefrontal cortex, the rational part of your brain  Now, let's choose a metaphor to describe this activity. Observing urges to eat is like observing the sunset. The nahun changes color from blue to red. Then, little by little, it transitions to night and everything becomes peaceful. If you wait a while and the urge was simply due to a habit of handling unpleasant feelings (and not hunger), you will often find that the urge to eat fades away. If you don't resist and act on an urge, don't worry! You can always try this exercise again the next time. You can fail as many times as you want (though I would consider it succeeding, because you have become conscious of the real feelings at play). Just do not give up; eventually you will find yourself making better choices. Be patient and compassionate with yourself! 9. Keep Your Expectations in Check  By now, you know mindfulness meditation has numerous benefits.  Nevertheless, many people do not experience these benefits in the short term and therefore give up their meditation practice altogether, which is really a shame. Mindfulness meditation is a lifelong commitment and brings amazing results when there is consistency. You wouldn't go to the gym expecting to build muscle in a week, would you? You understand how meaningless that would be. The same goes for meditation. When you are aware of this and commit to your daily practice, you will experience its long-term advantages.

## 2022-05-05 ENCOUNTER — OFFICE VISIT (OUTPATIENT)
Dept: PSYCHIATRY | Age: 27
End: 2022-05-05
Payer: COMMERCIAL

## 2022-05-05 DIAGNOSIS — F43.10 PTSD (POST-TRAUMATIC STRESS DISORDER): Primary | ICD-10-CM

## 2022-05-05 PROCEDURE — 99205 OFFICE O/P NEW HI 60 MIN: CPT | Performed by: NURSE PRACTITIONER

## 2022-05-05 PROCEDURE — G8427 DOCREV CUR MEDS BY ELIG CLIN: HCPCS | Performed by: NURSE PRACTITIONER

## 2022-05-05 PROCEDURE — G8420 CALC BMI NORM PARAMETERS: HCPCS | Performed by: NURSE PRACTITIONER

## 2022-05-05 PROCEDURE — 4004F PT TOBACCO SCREEN RCVD TLK: CPT | Performed by: NURSE PRACTITIONER

## 2022-05-05 ASSESSMENT — PATIENT HEALTH QUESTIONNAIRE - PHQ9
5. POOR APPETITE OR OVEREATING: 3
SUM OF ALL RESPONSES TO PHQ QUESTIONS 1-9: 16
SUM OF ALL RESPONSES TO PHQ9 QUESTIONS 1 & 2: 4
SUM OF ALL RESPONSES TO PHQ QUESTIONS 1-9: 16
8. MOVING OR SPEAKING SO SLOWLY THAT OTHER PEOPLE COULD HAVE NOTICED. OR THE OPPOSITE, BEING SO FIGETY OR RESTLESS THAT YOU HAVE BEEN MOVING AROUND A LOT MORE THAN USUAL: 1
6. FEELING BAD ABOUT YOURSELF - OR THAT YOU ARE A FAILURE OR HAVE LET YOURSELF OR YOUR FAMILY DOWN: 0
10. IF YOU CHECKED OFF ANY PROBLEMS, HOW DIFFICULT HAVE THESE PROBLEMS MADE IT FOR YOU TO DO YOUR WORK, TAKE CARE OF THINGS AT HOME, OR GET ALONG WITH OTHER PEOPLE: 2
1. LITTLE INTEREST OR PLEASURE IN DOING THINGS: 2
7. TROUBLE CONCENTRATING ON THINGS, SUCH AS READING THE NEWSPAPER OR WATCHING TELEVISION: 2
2. FEELING DOWN, DEPRESSED OR HOPELESS: 2
SUM OF ALL RESPONSES TO PHQ QUESTIONS 1-9: 16
4. FEELING TIRED OR HAVING LITTLE ENERGY: 3
3. TROUBLE FALLING OR STAYING ASLEEP: 3
9. THOUGHTS THAT YOU WOULD BE BETTER OFF DEAD, OR OF HURTING YOURSELF: 0
SUM OF ALL RESPONSES TO PHQ QUESTIONS 1-9: 16

## 2022-05-05 ASSESSMENT — ANXIETY QUESTIONNAIRES
4. TROUBLE RELAXING: 2-OVER HALF THE DAYS
2. NOT BEING ABLE TO STOP OR CONTROL WORRYING: 1-SEVERAL DAYS
GAD7 TOTAL SCORE: 8
3. WORRYING TOO MUCH ABOUT DIFFERENT THINGS: 2-OVER HALF THE DAYS
7. FEELING AFRAID AS IF SOMETHING AWFUL MIGHT HAPPEN: 0-NOT AT ALL
1. FEELING NERVOUS, ANXIOUS, OR ON EDGE: 1
6. BECOMING EASILY ANNOYED OR IRRITABLE: 1-SEVERAL DAYS
5. BEING SO RESTLESS THAT IT IS HARD TO SIT STILL: 1-SEVERAL DAYS
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
## 2022-05-05 NOTE — PATIENT INSTRUCTIONS
Seroquel- mood, anxiety, depression, SLEEP  Possible ADHD medication in future-Adderall  Antidepressant therapy- Celexa, Lexapro?

## 2022-05-13 ENCOUNTER — OFFICE VISIT (OUTPATIENT)
Dept: PSYCHOLOGY | Age: 27
End: 2022-05-13
Payer: COMMERCIAL

## 2022-05-13 DIAGNOSIS — F33.1 MAJOR DEPRESSIVE DISORDER, RECURRENT EPISODE, MODERATE (HCC): Primary | ICD-10-CM

## 2022-05-13 PROCEDURE — 90832 PSYTX W PT 30 MINUTES: CPT | Performed by: PSYCHOLOGIST

## 2022-05-13 PROCEDURE — 4004F PT TOBACCO SCREEN RCVD TLK: CPT | Performed by: PSYCHOLOGIST

## 2022-05-13 ASSESSMENT — PATIENT HEALTH QUESTIONNAIRE - PHQ9
SUM OF ALL RESPONSES TO PHQ QUESTIONS 1-9: 20
3. TROUBLE FALLING OR STAYING ASLEEP: 3
SUM OF ALL RESPONSES TO PHQ QUESTIONS 1-9: 19
9. THOUGHTS THAT YOU WOULD BE BETTER OFF DEAD, OR OF HURTING YOURSELF: 1
1. LITTLE INTEREST OR PLEASURE IN DOING THINGS: 3
SUM OF ALL RESPONSES TO PHQ9 QUESTIONS 1 & 2: 4
7. TROUBLE CONCENTRATING ON THINGS, SUCH AS READING THE NEWSPAPER OR WATCHING TELEVISION: 3
SUM OF ALL RESPONSES TO PHQ QUESTIONS 1-9: 20
2. FEELING DOWN, DEPRESSED OR HOPELESS: 1
4. FEELING TIRED OR HAVING LITTLE ENERGY: 3
8. MOVING OR SPEAKING SO SLOWLY THAT OTHER PEOPLE COULD HAVE NOTICED. OR THE OPPOSITE, BEING SO FIGETY OR RESTLESS THAT YOU HAVE BEEN MOVING AROUND A LOT MORE THAN USUAL: 2
5. POOR APPETITE OR OVEREATING: 3
SUM OF ALL RESPONSES TO PHQ QUESTIONS 1-9: 20
6. FEELING BAD ABOUT YOURSELF - OR THAT YOU ARE A FAILURE OR HAVE LET YOURSELF OR YOUR FAMILY DOWN: 1

## 2022-05-13 ASSESSMENT — ANXIETY QUESTIONNAIRES
7. FEELING AFRAID AS IF SOMETHING AWFUL MIGHT HAPPEN: 1-SEVERAL DAYS
6. BECOMING EASILY ANNOYED OR IRRITABLE: 2-OVER HALF THE DAYS
5. BEING SO RESTLESS THAT IT IS HARD TO SIT STILL: 2-OVER HALF THE DAYS
1. FEELING NERVOUS, ANXIOUS, OR ON EDGE: 2
2. NOT BEING ABLE TO STOP OR CONTROL WORRYING: 1-SEVERAL DAYS
3. WORRYING TOO MUCH ABOUT DIFFERENT THINGS: 1-SEVERAL DAYS
4. TROUBLE RELAXING: 3-NEARLY EVERY DAY
GAD7 TOTAL SCORE: 12

## 2022-05-13 NOTE — PROGRESS NOTES
Behavioral Health Consultation  Salvador Luciano, Ph.D.  Psychologist  5/13/2022  12:00 PM EDT      Time spent with Patient: 30 minutes  This is patient's fifth [facility] appointment. Reason for Consult: depression  Referring Provider: Asia Bullard, [address] [facility] [address] [place] [id number]    Feedback for PCP:     Pt provided informed consent for the behavioral health program. Discussed with patient model of service to include the limits of confidentiality (i.e. abuse reporting, suicide intervention, etc.) and short-term intervention focused approach. Pt indicated understanding. Feedback given to PCP. S:  The patient reports that he feels that things are moving in the direction he wants them to, but that he often isn't sure he can \"discern between a feeling and a stimuli. \" He said that he is \"trying not to try to do nothing. \" He said that despite some of this confusion, \"I feel the buzz of something. \"       SI/HI: reports a suicide attempt by discontinuing \"eating and drinking. \"  Mental health history:     Social History     Tobacco Use    Smoking status: Current Some Day Smoker    Smokeless tobacco: Never Used   Substance Use Topics    Alcohol use: Yes        Illicit drugs:   Social History     Substance and Sexual Activity   Drug Use Yes    Types: Marijuana Cesar Bilberry)        O:  MSE:  Appearance: good hygiene   Attitude: cooperative and friendly  Consciousness: alert  Orientation: oriented to person, place, time, general circumstance  Memory: recent and remote memory intact  Attention/Concentration: intact during session  Psychomotor Activity: normal  Eye Contact: normal  Speech: rapid and pressured  Mood: less anxious  Affect: congruent  Perception: within normal limits  Thought Content: within normal limits  Thought Process: logical, coherent and goal-directed  Insight: good  Judgment: intact  Ability to understand instructions: Yes  Ability to respond meaningfully: Yes  Morbid Ideation: no   Suicide Assessment: suicidal ideation without plan or intent  Homicidal Ideation: no    A:  The patient seems to really be starting from scratch with experiencing his feelings and what his feelings may be associated with. We talked about the cognitive triad, but for him, most of this is theoretical. We talked about trying to simply identify what he is feeling in any particular moment, which may be a reasonable beginning place for him. ALEJO 7 SCORE 5/13/2022 5/5/2022 4/29/2022 4/8/2022 3/18/2022   ALEJO-7 Total Score 12 8 6 15 13     Interpretation of ALEJO-7 score: 5-9 = mild anxiety, 10-14 = moderate anxiety, 15+ = severe anxiety. Recommend referral to behavioral health for scores 10 or greater. PHQ Scores 5/13/2022 5/5/2022 4/29/2022 4/15/2022 4/8/2022 3/18/2022 3/7/2022   PHQ2 Score 4 4 3 5 5 5 1   PHQ9 Score 20 16 11 16 19 18 1     Interpretation of Total Score Depression Severity: 1-4 = Minimal depression, 5-9 = Mild depression, 10-14 = Moderate depression, 15-19 = Moderately severe depression, 20-27 = Severe depression    Diagnosis:    1. Major depressive disorder, recurrent episode, moderate (HCC)        There is no problem list on file for this patient. Plan:  Pt interventions:  Established rapport, [place]-setting to identify pt's primary goals for [facility] [place] visit / overall health, Supportive techniques and Provided Psychoeducation re: treatment of depression.        Pt Behavioral Change Plan:  Pt set the following goals:  Pt scheduled F/U visit in two weeks  Pt will see OSCAR Peralta, CNP on 5/5

## 2022-06-17 ENCOUNTER — OFFICE VISIT (OUTPATIENT)
Dept: PSYCHOLOGY | Age: 27
End: 2022-06-17
Payer: COMMERCIAL

## 2022-06-17 DIAGNOSIS — F43.10 PTSD (POST-TRAUMATIC STRESS DISORDER): Primary | ICD-10-CM

## 2022-06-17 DIAGNOSIS — F34.1 PERSISTENT DEPRESSIVE DISORDER WITH ANXIOUS DISTRESS, CURRENTLY MODERATE: ICD-10-CM

## 2022-06-17 PROCEDURE — 4004F PT TOBACCO SCREEN RCVD TLK: CPT | Performed by: PSYCHOLOGIST

## 2022-06-17 PROCEDURE — 90832 PSYTX W PT 30 MINUTES: CPT | Performed by: PSYCHOLOGIST

## 2022-06-17 ASSESSMENT — PATIENT HEALTH QUESTIONNAIRE - PHQ9
9. THOUGHTS THAT YOU WOULD BE BETTER OFF DEAD, OR OF HURTING YOURSELF: 1
SUM OF ALL RESPONSES TO PHQ QUESTIONS 1-9: 18
1. LITTLE INTEREST OR PLEASURE IN DOING THINGS: 1
8. MOVING OR SPEAKING SO SLOWLY THAT OTHER PEOPLE COULD HAVE NOTICED. OR THE OPPOSITE, BEING SO FIGETY OR RESTLESS THAT YOU HAVE BEEN MOVING AROUND A LOT MORE THAN USUAL: 2
6. FEELING BAD ABOUT YOURSELF - OR THAT YOU ARE A FAILURE OR HAVE LET YOURSELF OR YOUR FAMILY DOWN: 2
SUM OF ALL RESPONSES TO PHQ QUESTIONS 1-9: 18
SUM OF ALL RESPONSES TO PHQ QUESTIONS 1-9: 18
7. TROUBLE CONCENTRATING ON THINGS, SUCH AS READING THE NEWSPAPER OR WATCHING TELEVISION: 3
3. TROUBLE FALLING OR STAYING ASLEEP: 2
5. POOR APPETITE OR OVEREATING: 3
4. FEELING TIRED OR HAVING LITTLE ENERGY: 3
SUM OF ALL RESPONSES TO PHQ9 QUESTIONS 1 & 2: 2
2. FEELING DOWN, DEPRESSED OR HOPELESS: 1
SUM OF ALL RESPONSES TO PHQ QUESTIONS 1-9: 17

## 2022-06-17 NOTE — PROGRESS NOTES
Behavioral Health Consultation  Paulette Pham, Ph.D.  Psychologist  6/17/2022  11:30 AM EDT      Time spent with Patient: 30 minutes  This is patient's fifth [facility] appointment. Reason for Consult: depression  Referring Provider: Jordan Hayward, [address] [facility] [address] [place] [id number]    Feedback for PCP:     Pt provided informed consent for the behavioral health program. Discussed with patient model of service to include the limits of confidentiality (i.e. abuse reporting, suicide intervention, etc.) and short-term intervention focused approach. Pt indicated understanding. Feedback given to PCP. S:  The patient reports feeling very positive, that he feels less anxious. He said \"I feel good but antsy,\" with the antsy about finding ways to be more productive. He said, \"I've patched the holes in my energy reservoir. \" He is trying to figure out how to be productive with either job or putting himself in a learning environment. He said that he doesn't sleep much but feels his sleep is okay. He also said he doesn't eat much but that his weight doesn't really fluctuate much. He said that he's begin to see a woman and that that is fun. SI/HI: reports a suicide attempt by discontinuing \"eating and drinking. \"  Mental health history:     Social History     Tobacco Use    Smoking status: Current Some Day Smoker    Smokeless tobacco: Never Used   Substance Use Topics    Alcohol use: Yes        Illicit drugs:   Social History     Substance and Sexual Activity   Drug Use Yes    Types: Marijuana Valentina Dasen)        O:  MSE:  Appearance: good hygiene   Attitude: cooperative and friendly  Consciousness: alert  Orientation: oriented to person, place, time, general circumstance  Memory: recent and remote memory intact  Attention/Concentration: intact during session  Psychomotor Activity: normal  Eye Contact: normal  Speech: rapid and pressured  Mood: less anxious  Affect: congruent  Perception: within normal limits  Thought Content: within normal limits  Thought Process: logical, coherent and goal-directed  Insight: good  Judgment: intact  Ability to understand instructions: Yes  Ability to respond meaningfully: Yes  Morbid Ideation: no   Suicide Assessment: suicidal ideation without plan or intent  Homicidal Ideation: no    A:  The patient seems to be using his tools to help him manage his emotions and is aware of thinking errors that can pull his mood down. He says that he is much less anxious and that he likes \"feeling the absence of anxiousness. \"      ALEJO 7 SCORE 5/13/2022 5/5/2022 4/29/2022 4/8/2022 3/18/2022   ALEJO-7 Total Score 12 8 6 15 13     Interpretation of ALEJO-7 score: 5-9 = mild anxiety, 10-14 = moderate anxiety, 15+ = severe anxiety. Recommend referral to behavioral health for scores 10 or greater. PHQ Scores 6/17/2022 5/13/2022 5/5/2022 4/29/2022 4/15/2022 4/8/2022 3/18/2022   PHQ2 Score 2 4 4 3 5 5 5   PHQ9 Score 18 20 16 11 16 19 18     Interpretation of Total Score Depression Severity: 1-4 = Minimal depression, 5-9 = Mild depression, 10-14 = Moderate depression, 15-19 = Moderately severe depression, 20-27 = Severe depression    Diagnosis:    1. PTSD (post-traumatic stress disorder)    2. Persistent depressive disorder with anxious distress, currently moderate        There is no problem list on file for this patient. Plan:  Pt interventions:  Established rapport, [place]-setting to identify pt's primary goals for [facility] visit / overall health, Supportive techniques and Provided Psychoeducation re: treatment of depression.        Pt Behavioral Change Plan:  Pt set the following goals:  Pt scheduled F/U visit in two weeks

## 2022-07-08 ENCOUNTER — OFFICE VISIT (OUTPATIENT)
Dept: PSYCHOLOGY | Age: 27
End: 2022-07-08
Payer: COMMERCIAL

## 2022-07-08 DIAGNOSIS — F43.10 COMPLEX POSTTRAUMATIC STRESS DISORDER: Primary | ICD-10-CM

## 2022-07-08 DIAGNOSIS — F34.1 PERSISTENT DEPRESSIVE DISORDER WITH ANXIOUS DISTRESS, CURRENTLY MODERATE: ICD-10-CM

## 2022-07-08 PROCEDURE — 90832 PSYTX W PT 30 MINUTES: CPT | Performed by: PSYCHOLOGIST

## 2022-07-08 PROCEDURE — 4004F PT TOBACCO SCREEN RCVD TLK: CPT | Performed by: PSYCHOLOGIST

## 2022-07-08 NOTE — PROGRESS NOTES
Behavioral Health Consultation  Lisa Chan, Ph.D.  Psychologist  7/8/2022  3:00 PM EDT      Time spent with Patient: 30 minutes  This is patient's seventh [facility] appointment. Reason for Consult: depression  Referring Provider: Rebecca Garcia, [address] [facility] [address] [place] [address]    Feedback for PCP:     Pt provided informed consent for the behavioral health program. Discussed with patient model of service to include the limits of confidentiality (i.e. abuse reporting, suicide intervention, etc.) and short-term intervention focused approach. Pt indicated understanding. Feedback given to PCP. S:  The patient describes his tendency for \"spinning\" and thinking of \"1,000 ways to interpret\" what is said and done, giving an example of dropping a fork on the floor, and extrapolating what that could portend for the rest of the day. He said initially that he didn't know why he comes to these visits, that he is taking time that others could be using to get better. Through the visit, he then had the thought that what is happening for him in these visits is that he is \"shedding slag in the smelting process. \" He said that he realizes that he has \"reluctance to engage with things I do not understand. \"       SI/HI: reports a suicide attempt by discontinuing \"eating and drinking. \"  Mental health history:     Social History     Tobacco Use    Smoking status: Current Some Day Smoker    Smokeless tobacco: Never Used   Substance Use Topics    Alcohol use: Yes        Illicit drugs:   Social History     Substance and Sexual Activity   Drug Use Yes    Types: Marijuana Marianne Keyshawn)        O:  MSE:  Appearance: good hygiene   Attitude: cooperative and friendly  Consciousness: alert  Orientation: oriented to person, place, time, general circumstance  Memory: recent and remote memory intact  Attention/Concentration: intact during session  Psychomotor Activity: normal  Eye Contact: normal  Speech: rapid and pressured  Mood: lanxious  Affect: congruent  Perception: within normal limits  Thought Content: within normal limits  Thought Process: logical, coherent and goal-directed  Insight: good  Judgment: intact  Ability to understand instructions: Yes  Ability to respond meaningfully: Yes  Morbid Ideation: no   Suicide Assessment: suicidal ideation without plan or intent  Homicidal Ideation: no    A:  The patient seems to use these visits to square his chaotic painful childhood experiences with his default narratives as an adult, and recognize them and try to alter them to reflect his here-and-now. We talked about the concept of mindfulness in being able to recognize what is actually happening in any given moment, rather than focusing on and being taken away from the here-and-now by his childhood, traumatic narratives. ALEJO 7 SCORE 5/13/2022 5/5/2022 4/29/2022 4/8/2022 3/18/2022   ALEJO-7 Total Score 12 8 6 15 13     Interpretation of ALEJO-7 score: 5-9 = mild anxiety, 10-14 = moderate anxiety, 15+ = severe anxiety. Recommend referral to behavioral health for scores 10 or greater. PHQ Scores 6/17/2022 5/13/2022 5/5/2022 4/29/2022 4/15/2022 4/8/2022 3/18/2022   PHQ2 Score 2 4 4 3 5 5 5   PHQ9 Score 18 20 16 11 16 19 18     Interpretation of Total Score Depression Severity: 1-4 = Minimal depression, 5-9 = Mild depression, 10-14 = Moderate depression, 15-19 = Moderately severe depression, 20-27 = Severe depression    Diagnosis:    1. Complex posttraumatic stress disorder    2. Persistent depressive disorder with anxious distress, currently moderate        There is no problem list on file for this patient. Plan:  Pt interventions:  Established rapport, [place]-setting to identify pt's primary goals for [facility] visit / overall health, Supportive techniques and Provided Psychoeducation re: treatment of depression.        Pt Behavioral Change Plan:  Pt set the following goals:  Pt scheduled F/U visit in two weeks

## 2022-07-20 ENCOUNTER — OFFICE VISIT (OUTPATIENT)
Dept: PSYCHOLOGY | Age: 27
End: 2022-07-20
Payer: COMMERCIAL

## 2022-07-20 DIAGNOSIS — F43.10 COMPLEX POSTTRAUMATIC STRESS DISORDER: Primary | ICD-10-CM

## 2022-07-20 PROCEDURE — 90832 PSYTX W PT 30 MINUTES: CPT | Performed by: PSYCHOLOGIST

## 2022-07-20 PROCEDURE — 4004F PT TOBACCO SCREEN RCVD TLK: CPT | Performed by: PSYCHOLOGIST

## 2022-07-20 NOTE — PROGRESS NOTES
Behavioral Health Consultation  Renee Hong, Ph.D.  Psychologist  7/20/2022  3:00 PM EDT      Time spent with Patient: 30 minutes  This is patient's seventh [facility] appointment. Reason for Consult: depression  Referring Provider: Francisco J Kam, [address] [facility] [address] [place] [id number]    Feedback for PCP:     Pt provided informed consent for the behavioral health program. Discussed with patient model of service to include the limits of confidentiality (i.e. abuse reporting, suicide intervention, etc.) and short-term intervention focused approach. Pt indicated understanding. Feedback given to PCP. S:  The patient reports that he is still \"chugging along\" on his intention to place his life more in the present than in the past. He talked about how he is learning how to balance sleep and eating, that both of those activities had been 'driven' in the past, and sometimes, off, then on. So he is finding a way for both to reflect what is happening now. SI/HI: reports a suicide attempt by discontinuing \"eating and drinking. \"  Mental health history:     Social History     Tobacco Use    Smoking status: Some Days    Smokeless tobacco: Never   Substance Use Topics    Alcohol use: Yes        Illicit drugs:   Social History     Substance and Sexual Activity   Drug Use Yes    Types: Marijuana Hulon Finney)        O:  MSE:  Appearance: good hygiene   Attitude: cooperative and friendly  Consciousness: alert  Orientation: oriented to person, place, time, general circumstance  Memory: recent and remote memory intact  Attention/Concentration: intact during session  Psychomotor Activity: normal  Eye Contact: normal  Speech: rapid and pressured  Mood: lanxious  Affect: congruent  Perception: within normal limits  Thought Content: within normal limits  Thought Process: logical, coherent and goal-directed  Insight: good  Judgment: intact  Ability to understand instructions: Yes  Ability to respond meaningfully: Yes  Morbid Ideation: no   Suicide Assessment: suicidal ideation without plan or intent  Homicidal Ideation: no    A:  The patient appears to be very much still dedicated to finding an emotionally healthy way to live his life and leaving the trauma-related non-chosen behaviors in the past.     ALEJO 7 SCORE 5/13/2022 5/5/2022 4/29/2022 4/8/2022 3/18/2022   ALEJO-7 Total Score 12 8 6 15 13     Interpretation of ALEJO-7 score: 5-9 = mild anxiety, 10-14 = moderate anxiety, 15+ = severe anxiety. Recommend referral to behavioral health for scores 10 or greater. PHQ Scores 6/17/2022 5/13/2022 5/5/2022 4/29/2022 4/15/2022 4/8/2022 3/18/2022   PHQ2 Score 2 4 4 3 5 5 5   PHQ9 Score 18 20 16 11 16 19 18     Interpretation of Total Score Depression Severity: 1-4 = Minimal depression, 5-9 = Mild depression, 10-14 = Moderate depression, 15-19 = Moderately severe depression, 20-27 = Severe depression    Diagnosis:    1. Complex posttraumatic stress disorder          There is no problem list on file for this patient. Plan:  Pt interventions:  Established rapport, [place]-setting to identify pt's primary goals for [facility] visit / overall health, Supportive techniques and Provided Psychoeducation re: treatment of depression .        Pt Behavioral Change Plan:  Pt set the following goals:  Pt scheduled F/U visit in two weeks

## 2022-08-10 ENCOUNTER — OFFICE VISIT (OUTPATIENT)
Dept: PSYCHOLOGY | Age: 27
End: 2022-08-10
Payer: COMMERCIAL

## 2022-08-10 DIAGNOSIS — F43.10 PTSD (POST-TRAUMATIC STRESS DISORDER): Primary | ICD-10-CM

## 2022-08-10 PROCEDURE — 90832 PSYTX W PT 30 MINUTES: CPT | Performed by: PSYCHOLOGIST

## 2022-08-10 PROCEDURE — 4004F PT TOBACCO SCREEN RCVD TLK: CPT | Performed by: PSYCHOLOGIST
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
## 2022-09-02 ENCOUNTER — OFFICE VISIT (OUTPATIENT)
Dept: PSYCHOLOGY | Age: 27
End: 2022-09-02
Payer: COMMERCIAL

## 2022-09-02 DIAGNOSIS — F43.10 PTSD (POST-TRAUMATIC STRESS DISORDER): Primary | ICD-10-CM

## 2022-09-02 PROCEDURE — 4004F PT TOBACCO SCREEN RCVD TLK: CPT | Performed by: PSYCHOLOGIST

## 2022-09-02 PROCEDURE — 90832 PSYTX W PT 30 MINUTES: CPT | Performed by: PSYCHOLOGIST
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
## 2022-09-16 ENCOUNTER — OFFICE VISIT (OUTPATIENT)
Dept: PSYCHOLOGY | Age: 27
End: 2022-09-16
Payer: COMMERCIAL

## 2022-09-16 DIAGNOSIS — F43.10 PTSD (POST-TRAUMATIC STRESS DISORDER): Primary | ICD-10-CM

## 2022-09-16 PROCEDURE — 4004F PT TOBACCO SCREEN RCVD TLK: CPT | Performed by: PSYCHOLOGIST

## 2022-09-16 PROCEDURE — 90832 PSYTX W PT 30 MINUTES: CPT | Performed by: PSYCHOLOGIST

## 2022-09-16 NOTE — PROGRESS NOTES
Behavioral Health Consultation  Trace Junior, Ph.D.  Psychologist  9/16/2022  2:00 PM EDT      Time spent with Patient: 30 minutes  This is patient's eighth [facility] appointment. Reason for Consult: depression  Referring Provider: Kelly Heredia, [address] [facility] [address] [place] [id number]    Feedback for PCP:     Pt provided informed consent for the behavioral health program. Discussed with patient model of service to include the limits of confidentiality (i.e. abuse reporting, suicide intervention, etc.) and short-term intervention focused approach. Pt indicated understanding. Feedback given to PCP. S:  The patient reports that he is gaining weight, which is a positive for him. He said that he is eating better, \"less junk, and less internal tension. \" He said that he is making a conscious choice to be more mindful and intentional, but that sometimes mindfulness is blocked by \"a droning numbness. \" He said the extremes of his emotions are evening out, but that sometimes that \"feels a little constraining. \"       SI/HI: reports a suicide attempt by discontinuing \"eating and drinking. \"  Mental health history:     Social History     Tobacco Use    Smoking status: Some Days    Smokeless tobacco: Never   Substance Use Topics    Alcohol use: Yes        Illicit drugs:   Social History     Substance and Sexual Activity   Drug Use Yes    Types: Marijuana Juanetta [place])        O:  MSE:  Appearance: good hygiene   Attitude: cooperative and friendly  Consciousness: alert  Orientation: oriented to person, place, time, general circumstance  Memory: recent and remote memory intact  Attention/Concentration: intact during session  Psychomotor Activity: normal  Eye Contact: normal  Speech: rapid and pressured  Mood: lanxious  Affect: congruent  Perception: within normal limits  Thought Content: within normal limits  Thought Process: logical, coherent and goal-directed  Insight: good  Judgment: intact  Ability to understand

## 2022-09-30 ENCOUNTER — OFFICE VISIT (OUTPATIENT)
Dept: PSYCHOLOGY | Age: 27
End: 2022-09-30
Payer: COMMERCIAL

## 2022-09-30 DIAGNOSIS — F43.10 PTSD (POST-TRAUMATIC STRESS DISORDER): Primary | ICD-10-CM

## 2022-09-30 PROCEDURE — 4004F PT TOBACCO SCREEN RCVD TLK: CPT | Performed by: PSYCHOLOGIST

## 2022-09-30 PROCEDURE — 90832 PSYTX W PT 30 MINUTES: CPT | Performed by: PSYCHOLOGIST

## 2022-09-30 NOTE — PROGRESS NOTES
Behavioral Health Consultation  Radha Holt, Ph.D.  Psychologist  9/30/2022  2:00 PM EDT      Time spent with Patient: 30 minutes  This is patient's eighth [facility] appointment. Reason for Consult: depression  Referring Provider: oCurt Prabhakar, [address] [facility] [address] [place] [id number]    Feedback for PCP:     Pt provided informed consent for the behavioral health program. Discussed with patient model of service to include the limits of confidentiality (i.e. abuse reporting, suicide intervention, etc.) and short-term intervention focused approach. Pt indicated understanding. Feedback given to PCP. S:  The patient reports that he's had \"ups and downs. \" He notices \"a bunch of small changes,\" and feels as though he's heading in the right direction. He says that he's feeling guilty about wanting a relationship, related to feeling undeserving at times, but he says that he can more often than not correct his thinking. SI/HI: reports a suicide attempt by discontinuing \"eating and drinking. \"  Mental health history:     Social History     Tobacco Use    Smoking status: Some Days    Smokeless tobacco: Never   Substance Use Topics    Alcohol use: Yes        Illicit drugs:   Social History     Substance and Sexual Activity   Drug Use Yes    Types: Marijuana Nan Trudy)        O:  MSE:  Appearance: good hygiene   Attitude: cooperative and friendly  Consciousness: alert  Orientation: oriented to person, place, time, general circumstance  Memory: recent and remote memory intact  Attention/Concentration: intact during session  Psychomotor Activity: normal  Eye Contact: normal  Speech: rapid and pressured  Mood: lanxious  Affect: congruent  Perception: within normal limits  Thought Content: within normal limits  Thought Process: logical, coherent and goal-directed  Insight: good  Judgment: intact  Ability to understand instructions: Yes  Ability to respond meaningfully: Yes  Morbid Ideation: no   Suicide Assessment: suicidal ideation without plan or intent  Homicidal Ideation: no    A:  This process is not easy for the patient but at each visit he reports that he feels good about the direction he's heading. ALEJO 7 SCORE 10/21/2022 5/13/2022 5/5/2022 4/29/2022 4/8/2022 3/18/2022   ALEJO-7 Total Score 6 12 8 6 15 13     Interpretation of ALEJO-7 score: 5-9 = mild anxiety, 10-14 = moderate anxiety, 15+ = severe anxiety. Recommend referral to behavioral health for scores 10 or greater. PHQ Scores 10/21/2022 6/17/2022 5/13/2022 5/5/2022 4/29/2022 4/15/2022 4/8/2022   PHQ2 Score 1 2 4 4 3 5 5   PHQ9 Score 6 18 20 16 11 16 19     Interpretation of Total Score Depression Severity: 1-4 = Minimal depression, 5-9 = Mild depression, 10-14 = Moderate depression, 15-19 = Moderately severe depression, 20-27 = Severe depression    Diagnosis:    1. PTSD (post-traumatic stress disorder)        There is no problem list on file for this patient. Plan:  Pt interventions:  Established rapport, [place]-setting to identify pt's primary goals for [facility] visit / overall health, Supportive techniques and Provided Psychoeducation re: treatment of depression .        Pt Behavioral Change Plan:  Pt set the following goals:  Pt scheduled F/U visit in two weeks

## 2022-10-21 ENCOUNTER — OFFICE VISIT (OUTPATIENT)
Dept: PSYCHOLOGY | Age: 27
End: 2022-10-21
Payer: COMMERCIAL

## 2022-10-21 DIAGNOSIS — F43.10 PTSD (POST-TRAUMATIC STRESS DISORDER): Primary | ICD-10-CM

## 2022-10-21 PROCEDURE — 4004F PT TOBACCO SCREEN RCVD TLK: CPT | Performed by: PSYCHOLOGIST

## 2022-10-21 PROCEDURE — 90832 PSYTX W PT 30 MINUTES: CPT | Performed by: PSYCHOLOGIST

## 2022-10-21 ASSESSMENT — PATIENT HEALTH QUESTIONNAIRE - PHQ9
SUM OF ALL RESPONSES TO PHQ9 QUESTIONS 1 & 2: 1
SUM OF ALL RESPONSES TO PHQ QUESTIONS 1-9: 6
1. LITTLE INTEREST OR PLEASURE IN DOING THINGS: 1
SUM OF ALL RESPONSES TO PHQ QUESTIONS 1-9: 6
9. THOUGHTS THAT YOU WOULD BE BETTER OFF DEAD, OR OF HURTING YOURSELF: 0
5. POOR APPETITE OR OVEREATING: 0
2. FEELING DOWN, DEPRESSED OR HOPELESS: 0
8. MOVING OR SPEAKING SO SLOWLY THAT OTHER PEOPLE COULD HAVE NOTICED. OR THE OPPOSITE, BEING SO FIGETY OR RESTLESS THAT YOU HAVE BEEN MOVING AROUND A LOT MORE THAN USUAL: 0
SUM OF ALL RESPONSES TO PHQ QUESTIONS 1-9: 6
6. FEELING BAD ABOUT YOURSELF - OR THAT YOU ARE A FAILURE OR HAVE LET YOURSELF OR YOUR FAMILY DOWN: 0
SUM OF ALL RESPONSES TO PHQ QUESTIONS 1-9: 6
3. TROUBLE FALLING OR STAYING ASLEEP: 3
7. TROUBLE CONCENTRATING ON THINGS, SUCH AS READING THE NEWSPAPER OR WATCHING TELEVISION: 1
4. FEELING TIRED OR HAVING LITTLE ENERGY: 1

## 2022-10-21 ASSESSMENT — ANXIETY QUESTIONNAIRES
GAD7 TOTAL SCORE: 6
6. BECOMING EASILY ANNOYED OR IRRITABLE: 1-SEVERAL DAYS
7. FEELING AFRAID AS IF SOMETHING AWFUL MIGHT HAPPEN: 2-OVER HALF THE DAYS
4. TROUBLE RELAXING: 0-NOT AT ALL
2. NOT BEING ABLE TO STOP OR CONTROL WORRYING: 1-SEVERAL DAYS
3. WORRYING TOO MUCH ABOUT DIFFERENT THINGS: 1-SEVERAL DAYS
1. FEELING NERVOUS, ANXIOUS, OR ON EDGE: 1
5. BEING SO RESTLESS THAT IT IS HARD TO SIT STILL: 0-NOT AT ALL

## 2022-10-21 NOTE — PROGRESS NOTES
Behavioral Health Consultation  Elisa Diaz, Ph.D.  Psychologist  10/21/2022  2:00 PM EDT      Time spent with Patient: 30 minutes  This is patient's eighth [facility] appointment. Reason for Consult: depression  Referring Provider: Joey Lo, [address] [facility] [address] [place] [id number]    Feedback for PCP:     Pt provided informed consent for the behavioral health program. Discussed with patient model of service to include the limits of confidentiality (i.e. abuse reporting, suicide intervention, etc.) and short-term intervention focused approach. Pt indicated understanding. Feedback given to PCP. S:  The patient reports that \"I'm anxious about moving forward. I was fine with barely existing and was okay with it for so long. \" He has some feelings about turning 28 and has expectations for himself, \"now that I'm excited about life. \" He talked about previous relationships and \"I was quick to shed my boundaries. \"      SI/HI: reports a suicide attempt by discontinuing \"eating and drinking. \"  Mental health history:     Social History     Tobacco Use    Smoking status: Some Days    Smokeless tobacco: Never   Substance Use Topics    Alcohol use: Yes        Illicit drugs:   Social History     Substance and Sexual Activity   Drug Use Yes    Types: Marijuana Elvis Radon)        O:  MSE:  Appearance: good hygiene   Attitude: cooperative and friendly  Consciousness: alert  Orientation: oriented to person, place, time, general circumstance  Memory: recent and remote memory intact  Attention/Concentration: intact during session  Psychomotor Activity: normal  Eye Contact: normal  Speech: rapid and pressured  Mood: anxious  Affect: congruent  Perception: within normal limits  Thought Content: within normal limits  Thought Process: logical, coherent and goal-directed  Insight: good  Judgment: intact  Ability to understand instructions: Yes  Ability to respond meaningfully: Yes  Morbid Ideation: no   Suicide Assessment: suicidal ideation without plan or intent  Homicidal Ideation: no    A:  The patient continues to thoughtfully move forward. ALEJO 7 SCORE 10/21/2022 5/13/2022 5/5/2022 4/29/2022 4/8/2022 3/18/2022   ALEJO-7 Total Score 6 12 8 6 15 13     Interpretation of ALEJO-7 score: 5-9 = mild anxiety, 10-14 = moderate anxiety, 15+ = severe anxiety. Recommend referral to behavioral health for scores 10 or greater. PHQ Scores 10/21/2022 6/17/2022 5/13/2022 5/5/2022 4/29/2022 4/15/2022 4/8/2022   PHQ2 Score 1 2 4 4 3 5 5   PHQ9 Score 6 18 20 16 11 16 19     Interpretation of Total Score Depression Severity: 1-4 = Minimal depression, 5-9 = Mild depression, 10-14 = Moderate depression, 15-19 = Moderately severe depression, 20-27 = Severe depression    Diagnosis:    1. PTSD (post-traumatic stress disorder)                There is no problem list on file for this patient. Plan:  Pt interventions:  Established rapport, [place]-setting to identify pt's primary goals for [facility] visit / overall health, Supportive techniques and Provided Psychoeducation re: treatment of depression .        Pt Behavioral Change Plan:  Pt set the following goals:  Pt scheduled F/U visit in two weeks

## 2022-11-18 ENCOUNTER — OFFICE VISIT (OUTPATIENT)
Dept: PSYCHOLOGY | Age: 27
End: 2022-11-18

## 2022-11-18 DIAGNOSIS — F43.10 PTSD (POST-TRAUMATIC STRESS DISORDER): Primary | ICD-10-CM

## 2022-11-18 NOTE — PROGRESS NOTES
Behavioral Health Consultation  Ventura Jimenez, Ph.D.  Psychologist  11/18/2022  3:00 PM EDT      Time spent with Patient: 30 minutes  This is patient's ninth [facility] appointment. Reason for Consult: depression  Referring Provider: Joelle Jones, [address] [facility] [address] [place] [id number]    Feedback for PCP:     Pt provided informed consent for the behavioral health program. Discussed with patient model of service to include the limits of confidentiality (i.e. abuse reporting, suicide intervention, etc.) and short-term intervention focused approach. Pt indicated understanding. Feedback given to PCP. S:  The patient reports that he was hospitalized at [facility] for 2 weeks. He said that he \"gained a lot of perspective\" during his stay there, and found that the \"high dose of routine was very beneficial. He said that he realized that he had been in a hurry to get better, but now he knows that it's just \"good to be going. \" He also said that they changed his meds to Zyprexa and this also seems to help. SI/HI: reports a suicide attempt by discontinuing \"eating and drinking. \"  Mental health history:     Social History     Tobacco Use    Smoking status: Some Days    Smokeless tobacco: Never   Substance Use Topics    Alcohol use: Yes        Illicit drugs:   Social History     Substance and Sexual Activity   Drug Use Yes    Types: Marijuana Nora Guillen)        O:  MSE:  Appearance: good hygiene   Attitude: cooperative and friendly  Consciousness: alert  Orientation: oriented to person, place, time, general circumstance  Memory: recent and remote memory intact  Attention/Concentration: intact during session  Psychomotor Activity: normal  Eye Contact: normal  Speech: rapid and pressured  Mood: anxious  Affect: congruent  Perception: within normal limits  Thought Content: within normal limits  Thought Process: logical, coherent and goal-directed  Insight: good  Judgment: intact  Ability to understand instructions: Yes  Ability to respond meaningfully: Yes  Morbid Ideation: no   Suicide Assessment: suicidal ideation without plan or intent  Homicidal Ideation: no    A:  The patient talked about his hospitalization in an accepting and almost grateful tone. He said that he feels as though he gained perspective on where he is emotionally and what he needs moving forward. ALEJO 7 SCORE 10/21/2022 5/13/2022 5/5/2022 4/29/2022 4/8/2022 3/18/2022   ALEJO-7 Total Score 6 12 8 6 15 13     Interpretation of ALEJO-7 score: 5-9 = mild anxiety, 10-14 = moderate anxiety, 15+ = severe anxiety. Recommend referral to behavioral health for scores 10 or greater. PHQ Scores 10/21/2022 6/17/2022 5/13/2022 5/5/2022 4/29/2022 4/15/2022 4/8/2022   PHQ2 Score 1 2 4 4 3 5 5   PHQ9 Score 6 18 20 16 11 16 19     Interpretation of Total Score Depression Severity: 1-4 = Minimal depression, 5-9 = Mild depression, 10-14 = Moderate depression, 15-19 = Moderately severe depression, 20-27 = Severe depression    Diagnosis:    1. PTSD (post-traumatic stress disorder)                  There is no problem list on file for this patient. Plan:  Pt interventions:  Established rapport, [place]-setting to identify pt's primary goals for [facility] visit / overall health, Supportive techniques and Provided Psychoeducation re: treatment of depression .        Pt Behavioral Change Plan:  Pt set the following goals:  Pt scheduled F/U visit in two weeks

## 2022-12-09 ENCOUNTER — OFFICE VISIT (OUTPATIENT)
Dept: PSYCHOLOGY | Age: 27
End: 2022-12-09

## 2022-12-09 DIAGNOSIS — F43.10 PTSD (POST-TRAUMATIC STRESS DISORDER): Primary | ICD-10-CM

## 2022-12-09 NOTE — PROGRESS NOTES
Behavioral Health Consultation  Merlinda Roup, Ph.D.  Psychologist  12/9/2022  1:30 PM EDT      Time spent with Patient: 30 minutes  This is patient's ninth [facility] appointment. Reason for Consult: depression  Referring Provider: Ayleen Tapia, [address] [facility] [address] [place] [id number]    Feedback for PCP:     Pt provided informed consent for the behavioral health program. Discussed with patient model of service to include the limits of confidentiality (i.e. abuse reporting, suicide intervention, etc.) and short-term intervention focused approach. Pt indicated understanding. Feedback given to PCP. S:  The patient reports he thinks the Zyprexa is helping him feel like he has \"more stability. \" He said, \"When I have too many things coming at me, in the past I melted down, but now I seem to have more resilience. \" He said, \"I only feel it when I need it. \" He said that he is continuing to make progress in establishing routines and structure around sleep and meals, saying that he has begun to gain some weight. He said that he has also been more social, going to a jazz club and movies with friends. He said that he is \"growing into establishing himself as the \"head figure of the family,\" which makes him feel happy. He said [facility] [employer] [facility],\" that he allowed himself to benefit from. SI/HI: reports a suicide attempt by discontinuing \"eating and drinking. \"  Mental health history:     Social History     Tobacco Use    Smoking status: Some Days    Smokeless tobacco: Never   Substance Use Topics    Alcohol use: Yes        Illicit drugs:   Social History     Substance and Sexual Activity   Drug Use Yes    Types: Marijuana Deadra Jama)        O:  MSE:  Appearance: good hygiene   Attitude: cooperative and friendly  Consciousness: alert  Orientation: oriented to person, place, time, general circumstance  Memory: recent and remote memory intact  Attention/Concentration: intact during session  Psychomotor Activity: normal  Eye Contact: normal  Speech: rapid and pressured  Mood: less anxious  Affect: congruent  Perception: within normal limits  Thought Content: within normal limits  Thought Process: logical, coherent and goal-directed  Insight: good  Judgment: intact  Ability to understand instructions: Yes  Ability to respond meaningfully: Yes  Morbid Ideation: no   Suicide Assessment: suicidal ideation without plan or intent  Homicidal Ideation: no    A:  The patient is able to describe the ways in which he is making progress and establishing more emotional control and feeling happiness and contentment. ALEJO 7 SCORE 10/21/2022 5/13/2022 5/5/2022 4/29/2022 4/8/2022 3/18/2022   ALEJO-7 Total Score 6 12 8 6 15 13     Interpretation of ALEJO-7 score: 5-9 = mild anxiety, 10-14 = moderate anxiety, 15+ = severe anxiety. Recommend referral to behavioral health for scores 10 or greater. PHQ Scores 10/21/2022 6/17/2022 5/13/2022 5/5/2022 4/29/2022 4/15/2022 4/8/2022   PHQ2 Score 1 2 4 4 3 5 5   PHQ9 Score 6 18 20 16 11 16 19     Interpretation of Total Score Depression Severity: 1-4 = Minimal depression, 5-9 = Mild depression, 10-14 = Moderate depression, 15-19 = Moderately severe depression, 20-27 = Severe depression    Diagnosis:    1. PTSD (post-traumatic stress disorder)                  There is no problem list on file for this patient. Plan:  Pt interventions:  Established rapport, [place]-setting to identify pt's primary goals for [facility] visit / overall health, Supportive techniques and Provided Psychoeducation re: treatment of depression .        Pt Behavioral Change Plan:  Pt set the following goals:  Pt scheduled F/U visit in two weeks

## 2023-01-20 ENCOUNTER — OFFICE VISIT (OUTPATIENT)
Dept: PSYCHOLOGY | Age: 28
End: 2023-01-20
Payer: COMMERCIAL

## 2023-01-20 DIAGNOSIS — F43.10 PTSD (POST-TRAUMATIC STRESS DISORDER): Primary | ICD-10-CM

## 2023-01-20 PROCEDURE — 90832 PSYTX W PT 30 MINUTES: CPT | Performed by: PSYCHOLOGIST

## 2023-01-20 PROCEDURE — 4004F PT TOBACCO SCREEN RCVD TLK: CPT | Performed by: PSYCHOLOGIST
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
## 2023-02-08 ENCOUNTER — OFFICE VISIT (OUTPATIENT)
Dept: PSYCHOLOGY | Age: 28
End: 2023-02-08
Payer: COMMERCIAL

## 2023-02-08 DIAGNOSIS — F43.10 PTSD (POST-TRAUMATIC STRESS DISORDER): Primary | ICD-10-CM

## 2023-02-08 PROCEDURE — 90832 PSYTX W PT 30 MINUTES: CPT | Performed by: PSYCHOLOGIST

## 2023-02-08 PROCEDURE — 4004F PT TOBACCO SCREEN RCVD TLK: CPT | Performed by: PSYCHOLOGIST

## 2023-02-08 ASSESSMENT — PATIENT HEALTH QUESTIONNAIRE - PHQ9
SUM OF ALL RESPONSES TO PHQ QUESTIONS 1-9: 5
SUM OF ALL RESPONSES TO PHQ QUESTIONS 1-9: 5
6. FEELING BAD ABOUT YOURSELF - OR THAT YOU ARE A FAILURE OR HAVE LET YOURSELF OR YOUR FAMILY DOWN: 0
3. TROUBLE FALLING OR STAYING ASLEEP: 2
4. FEELING TIRED OR HAVING LITTLE ENERGY: 0
7. TROUBLE CONCENTRATING ON THINGS, SUCH AS READING THE NEWSPAPER OR WATCHING TELEVISION: 1
2. FEELING DOWN, DEPRESSED OR HOPELESS: 0
SUM OF ALL RESPONSES TO PHQ QUESTIONS 1-9: 5
1. LITTLE INTEREST OR PLEASURE IN DOING THINGS: 1
SUM OF ALL RESPONSES TO PHQ9 QUESTIONS 1 & 2: 1
9. THOUGHTS THAT YOU WOULD BE BETTER OFF DEAD, OR OF HURTING YOURSELF: 0
5. POOR APPETITE OR OVEREATING: 1
8. MOVING OR SPEAKING SO SLOWLY THAT OTHER PEOPLE COULD HAVE NOTICED. OR THE OPPOSITE, BEING SO FIGETY OR RESTLESS THAT YOU HAVE BEEN MOVING AROUND A LOT MORE THAN USUAL: 0
SUM OF ALL RESPONSES TO PHQ QUESTIONS 1-9: 5

## 2023-02-08 ASSESSMENT — ANXIETY QUESTIONNAIRES
3. WORRYING TOO MUCH ABOUT DIFFERENT THINGS: 0-NOT AT ALL
6. BECOMING EASILY ANNOYED OR IRRITABLE: 1-SEVERAL DAYS
7. FEELING AFRAID AS IF SOMETHING AWFUL MIGHT HAPPEN: 1-SEVERAL DAYS
5. BEING SO RESTLESS THAT IT IS HARD TO SIT STILL: 0-NOT AT ALL
1. FEELING NERVOUS, ANXIOUS, OR ON EDGE: 1
2. NOT BEING ABLE TO STOP OR CONTROL WORRYING: 0-NOT AT ALL
GAD7 TOTAL SCORE: 3
4. TROUBLE RELAXING: 0-NOT AT ALL

## 2023-02-08 NOTE — PROGRESS NOTES
Behavioral Health Consultation  Cynthia Vides, Ph.D.  Psychologist  2/8/2023  2:30 PM EDT      Time spent with Patient: 30 minutes  This is patient's ninth [facility] appointment. Reason for Consult: depression  Referring Provider: Colt Ivan, [address] [phone]    Feedback for PCP:     Pt provided informed consent for the behavioral health program. Discussed with patient model of service to include the limits of confidentiality (i.e. abuse reporting, suicide intervention, etc.) and short-term intervention focused approach. Pt indicated understanding. Feedback given to PCP. S:  The patient reports that his health behaviors are improving. In addition he appears to be becoming more optimistic. He is driving \"and sitting in the 's seat feels daunting. \" He is getting use to driving on narrow streets, even as other cars blow their horns at him to drive faster. He said that he is eating \"good enough\" and that he is not oversleeping as much. He talked about the process of getting a job and how important it is to have as good a match with his temperament and intelligence as possible. He said \"I'll make an adaptive choice. \"       SI/HI: reports a suicide attempt by discontinuing \"eating and drinking. \"  Mental health history:     Social History     Tobacco Use    Smoking status: Some Days    Smokeless tobacco: Never   Substance Use Topics    Alcohol use: Yes        Illicit drugs:   Social History     Substance and Sexual Activity   Drug Use Yes    Types: Marijuana [place]der Sails)        O:  MSE:  Appearance: good hygiene   Attitude: cooperative and friendly  Consciousness: alert  Orientation: oriented to person, place, time, general circumstance  Memory: recent and remote memory intact  Attention/Concentration: intact during session  Psychomotor Activity: normal  Eye Contact: normal  Speech: normal rate and volume, well-articulated  Mood: less anxious  Affect: congruent  Perception: within normal limits  Thought Content: within normal limits  Thought Process: logical, coherent and goal-directed  Insight: good  Judgment: intact  Ability to understand instructions: Yes  Ability to respond meaningfully: Yes  Morbid Ideation: no   Suicide Assessment: suicidal ideation without plan or intent  Homicidal Ideation: no    A:  The patient is not reporting behaviors, thoughts, experiences that are symptomatic of PTSD during this visit. His PHQ and ALEJO scores are trending down. We'll talk about his diagnosis of active PTSD at his next visit. ALEJO 7 SCORE 2/8/2023 10/21/2022 5/13/2022 5/5/2022 4/29/2022 4/8/2022 3/18/2022   ALEJO-7 Total Score 3 6 12 8 6 15 13     Interpretation of ALEJO-7 score: 5-9 = mild anxiety, 10-14 = moderate anxiety, 15+ = severe anxiety. Recommend referral to behavioral health for scores 10 or greater. PHQ Scores 2/8/2023 10/21/2022 6/17/2022 5/13/2022 5/5/2022 4/29/2022 4/15/2022   PHQ2 Score 1 1 2 4 4 3 5   PHQ9 Score 5 6 18 20 16 11 16     Interpretation of Total Score Depression Severity: 1-4 = Minimal depression, 5-9 = Mild depression, 10-14 = Moderate depression, 15-19 = Moderately severe depression, 20-27 = Severe depression    Diagnosis:    1. PTSD (post-traumatic stress disorder)                      There is no problem list on file for this patient. Plan:  Pt interventions:  Established rapport, [place]-setting to identify pt's primary goals for [facility] visit / overall health, Supportive techniques and Provided Psychoeducation re: treatment of depression .        Pt Behavioral Change Plan:  Pt set the following goals:  Pt scheduled F/U visit in two weeks

## 2023-09-10 ENCOUNTER — APPOINTMENT (OUTPATIENT)
Dept: ULTRASOUND IMAGING | Age: 28
End: 2023-09-10
Payer: COMMERCIAL

## 2023-09-10 ENCOUNTER — HOSPITAL ENCOUNTER (EMERGENCY)
Age: 28
Discharge: HOME | End: 2023-09-11
Payer: COMMERCIAL

## 2023-09-10 ENCOUNTER — APPOINTMENT (OUTPATIENT)
Dept: CT IMAGING | Age: 28
End: 2023-09-10
Payer: COMMERCIAL

## 2023-09-10 DIAGNOSIS — N20.1 URETEROLITHIASIS: Primary | ICD-10-CM

## 2023-09-10 DIAGNOSIS — N50.82 SCROTAL PAIN: ICD-10-CM

## 2023-09-10 LAB
ANION GAP SERPL CALCULATED.3IONS-SCNC: 13 MMOL/L (ref 3–16)
BACTERIA URNS QL MICRO: ABNORMAL /HPF
BASOPHILS # BLD: 0 K/UL (ref 0–0.2)
BASOPHILS NFR BLD: 0.4 %
BILIRUB UR QL STRIP.AUTO: NEGATIVE
BUN SERPL-MCNC: 16 MG/DL (ref 7–20)
CALCIUM SERPL-MCNC: 9.8 MG/DL (ref 8.3–10.6)
CHLORIDE SERPL-SCNC: 103 MMOL/L (ref 99–110)
CLARITY UR: CLEAR
CO2 SERPL-SCNC: 24 MMOL/L (ref 21–32)
COLOR UR: YELLOW
CREAT SERPL-MCNC: 1 MG/DL (ref 0.9–1.3)
DEPRECATED RDW RBC AUTO: 13.9 % (ref 12.4–15.4)
EOSINOPHIL # BLD: 0.1 K/UL (ref 0–0.6)
EOSINOPHIL NFR BLD: 1.1 %
EPI CELLS #/AREA URNS AUTO: 0 /HPF (ref 0–5)
GFR SERPLBLD CREATININE-BSD FMLA CKD-EPI: >60 ML/MIN/{1.73_M2}
GLUCOSE SERPL-MCNC: 100 MG/DL (ref 70–99)
GLUCOSE UR STRIP.AUTO-MCNC: NEGATIVE MG/DL
HCT VFR BLD AUTO: 41.6 % (ref 40.5–52.5)
HGB BLD-MCNC: 13.8 G/DL (ref 13.5–17.5)
HGB UR QL STRIP.AUTO: ABNORMAL
HYALINE CASTS #/AREA URNS AUTO: 0 /LPF (ref 0–8)
INR PPP: 1.01 (ref 0.84–1.16)
KETONES UR STRIP.AUTO-MCNC: ABNORMAL MG/DL
LEUKOCYTE ESTERASE UR QL STRIP.AUTO: ABNORMAL
LYMPHOCYTES # BLD: 3 K/UL (ref 1–5.1)
LYMPHOCYTES NFR BLD: 32.9 %
MCH RBC QN AUTO: 30.6 PG (ref 26–34)
MCHC RBC AUTO-ENTMCNC: 33.2 G/DL (ref 31–36)
MCV RBC AUTO: 92.1 FL (ref 80–100)
MONOCYTES # BLD: 0.6 K/UL (ref 0–1.3)
MONOCYTES NFR BLD: 7.1 %
NEUTROPHILS # BLD: 5.3 K/UL (ref 1.7–7.7)
NEUTROPHILS NFR BLD: 58.5 %
NITRITE UR QL STRIP.AUTO: NEGATIVE
PH UR STRIP.AUTO: 7 [PH] (ref 5–8)
PLATELET # BLD AUTO: 253 K/UL (ref 135–450)
PMV BLD AUTO: 8.5 FL (ref 5–10.5)
POTASSIUM SERPL-SCNC: 3.6 MMOL/L (ref 3.5–5.1)
PROT UR STRIP.AUTO-MCNC: ABNORMAL MG/DL
PROTHROMBIN TIME: 13.3 SEC (ref 11.5–14.8)
RBC # BLD AUTO: 4.51 M/UL (ref 4.2–5.9)
RBC CLUMPS #/AREA URNS AUTO: 21 /HPF (ref 0–4)
SODIUM SERPL-SCNC: 140 MMOL/L (ref 136–145)
SP GR UR STRIP.AUTO: 1.02 (ref 1–1.03)
UA COMPLETE W REFLEX CULTURE PNL UR: ABNORMAL
UA DIPSTICK W REFLEX MICRO PNL UR: YES
URN SPEC COLLECT METH UR: ABNORMAL
UROBILINOGEN UR STRIP-ACNC: 1 E.U./DL
WBC # BLD AUTO: 9.1 K/UL (ref 4–11)
WBC #/AREA URNS AUTO: 1 /HPF (ref 0–5)

## 2023-09-10 PROCEDURE — 6360000002 HC RX W HCPCS: Performed by: PHYSICIAN ASSISTANT

## 2023-09-10 PROCEDURE — 99284 EMERGENCY DEPT VISIT MOD MDM: CPT

## 2023-09-10 PROCEDURE — 81001 URINALYSIS AUTO W/SCOPE: CPT

## 2023-09-10 PROCEDURE — 76870 US EXAM SCROTUM: CPT

## 2023-09-10 PROCEDURE — 85610 PROTHROMBIN TIME: CPT

## 2023-09-10 PROCEDURE — 80048 BASIC METABOLIC PNL TOTAL CA: CPT

## 2023-09-10 PROCEDURE — 96374 THER/PROPH/DIAG INJ IV PUSH: CPT

## 2023-09-10 PROCEDURE — 96375 TX/PRO/DX INJ NEW DRUG ADDON: CPT

## 2023-09-10 PROCEDURE — 74176 CT ABD & PELVIS W/O CONTRAST: CPT

## 2023-09-10 PROCEDURE — 93975 VASCULAR STUDY: CPT

## 2023-09-10 PROCEDURE — 85025 COMPLETE CBC W/AUTO DIFF WBC: CPT

## 2023-09-10 RX ORDER — ONDANSETRON 2 MG/ML
4 INJECTION INTRAMUSCULAR; INTRAVENOUS ONCE
Status: COMPLETED | OUTPATIENT
Start: 2023-09-10 | End: 2023-09-10

## 2023-09-10 RX ORDER — KETOROLAC TROMETHAMINE 30 MG/ML
30 INJECTION, SOLUTION INTRAMUSCULAR; INTRAVENOUS ONCE
Status: COMPLETED | OUTPATIENT
Start: 2023-09-10 | End: 2023-09-10

## 2023-09-10 RX ADMIN — KETOROLAC TROMETHAMINE 30 MG: 30 INJECTION, SOLUTION INTRAMUSCULAR; INTRAVENOUS at 22:15

## 2023-09-10 RX ADMIN — ONDANSETRON 4 MG: 2 INJECTION INTRAMUSCULAR; INTRAVENOUS at 22:16

## 2023-09-10 ASSESSMENT — PAIN DESCRIPTION - DESCRIPTORS: DESCRIPTORS: SHARP

## 2023-09-10 ASSESSMENT — PAIN - FUNCTIONAL ASSESSMENT: PAIN_FUNCTIONAL_ASSESSMENT: 0-10

## 2023-09-10 ASSESSMENT — PAIN SCALES - GENERAL: PAINLEVEL_OUTOF10: 9

## 2023-09-10 ASSESSMENT — PAIN DESCRIPTION - ORIENTATION: ORIENTATION: LEFT

## 2023-09-10 ASSESSMENT — PAIN DESCRIPTION - LOCATION: LOCATION: SCROTUM

## 2023-09-11 VITALS
SYSTOLIC BLOOD PRESSURE: 119 MMHG | BODY MASS INDEX: 18.19 KG/M2 | WEIGHT: 134.26 LBS | RESPIRATION RATE: 16 BRPM | OXYGEN SATURATION: 99 % | HEART RATE: 56 BPM | TEMPERATURE: 98.1 F | HEIGHT: 72 IN | DIASTOLIC BLOOD PRESSURE: 75 MMHG

## 2023-09-11 PROCEDURE — 6370000000 HC RX 637 (ALT 250 FOR IP): Performed by: PHYSICIAN ASSISTANT

## 2023-09-11 RX ORDER — IBUPROFEN 600 MG/1
600 TABLET ORAL
Qty: 40 TABLET | Refills: 0 | Status: SHIPPED | OUTPATIENT
Start: 2023-09-11

## 2023-09-11 RX ORDER — TAMSULOSIN HYDROCHLORIDE 0.4 MG/1
0.4 CAPSULE ORAL ONCE
Status: COMPLETED | OUTPATIENT
Start: 2023-09-11 | End: 2023-09-11

## 2023-09-11 RX ORDER — ONDANSETRON 4 MG/1
4 TABLET, ORALLY DISINTEGRATING ORAL EVERY 8 HOURS PRN
Qty: 10 TABLET | Refills: 0 | Status: SHIPPED | OUTPATIENT
Start: 2023-09-11

## 2023-09-11 RX ORDER — ONDANSETRON 4 MG/1
4 TABLET, ORALLY DISINTEGRATING ORAL 3 TIMES DAILY PRN
Qty: 21 TABLET | Refills: 0 | Status: SHIPPED | OUTPATIENT
Start: 2023-09-11 | End: 2023-09-11 | Stop reason: SDUPTHER

## 2023-09-11 RX ORDER — TAMSULOSIN HYDROCHLORIDE 0.4 MG/1
0.4 CAPSULE ORAL NIGHTLY
Qty: 10 CAPSULE | Refills: 0 | Status: SHIPPED | OUTPATIENT
Start: 2023-09-11 | End: 2023-09-21

## 2023-09-11 RX ORDER — HYDROCODONE BITARTRATE AND ACETAMINOPHEN 5; 325 MG/1; MG/1
1 TABLET ORAL EVERY 6 HOURS PRN
Qty: 6 TABLET | Refills: 0 | Status: SHIPPED | OUTPATIENT
Start: 2023-09-11 | End: 2023-09-14

## 2023-09-11 RX ADMIN — TAMSULOSIN HYDROCHLORIDE 0.4 MG: 0.4 CAPSULE ORAL at 00:31

## 2023-09-11 ASSESSMENT — PAIN SCALES - GENERAL: PAINLEVEL_OUTOF10: 2

## 2023-09-11 NOTE — ED PROVIDER NOTES
[facility] [address] [id number]        Pt Name: Venessa Giang  MRN: [id number]  [address] [facility] 1995  Date of evaluation: 9/10/2023  Provider: Keli Weaver PA-C  PCP: OSCAR Ruiz  Note Started: 10:06 PM EDT 9/10/23      LEXY. I have evaluated this patient. CHIEF COMPLAINT       Chief Complaint   Patient presents with    Groin Swelling       HISTORY OF PRESENT ILLNESS: 1 or more Elements     History From: Patient    Venessa Giang is a 33 y.o. male who presents to the emergency department with complaint severe left testicular pain. Onset rather sudden with rapid acceleration. Onset 9 PM.  Assessment time 9:55 PM.  Patient has not had previous similar. He was sitting playing video games when it began. Patient experiencing 10/10 pain during my assessment. Nausea without vomiting. He appears somewhat pale likely due to the pain. Heart rate is 100. Afebrile. No urinary urgency, frequency or dysuria. No urethral discharge reported. No history of abdominal surgeries nor history of inguinal hernia. Nursing Notes were all reviewed and agreed with or any disagreements were addressed in the HPI. REVIEW OF SYSTEMS :      Review of Systems    Positives and Pertinent negatives as per HPI. SURGICAL HISTORY     Past Surgical History:   Procedure Laterality Date    OTHER SURGICAL HISTORY      in grown nail removal       CURRENTMEDICATIONS       Previous Medications    No medications on file       ALLERGIES     Patient has no known allergies.     FAMILYHISTORY       Family History   Problem Relation Age of Onset    Bipolar Disorder Father     ADHD Brother         SOCIAL HISTORY       Social History     Tobacco Use    Smoking status: Some Days    Smokeless tobacco: Never   Substance Use Topics    Alcohol use: Yes    Drug use: Yes     Types: Marijuana (Weed)       SCREENINGS        [place] Coma Scale  Eye Opening: Spontaneous  Best Verbal

## 2023-09-11 NOTE — DISCHARGE INSTRUCTIONS
Urine analysis showed evidence of blood. No sign of infection. Testicular ultrasound obtained due to the exquisite pain of presentation. Ultrasound negative for testicular abnormality. I did obtain a CT scan of your abdomen pelvis showing a 1-2 mm stone in the left ureter. This is the cause of your pain. This is small enough and is expected to pass. Use the urine strainer collection container given here in the ED. This should be used each time you urinate. Should you pass the stone and put it in the collection container. I recommend you contact urology tomorrow for an appointment later this week. You were given Flomax in the emergency room. This will help relax the ureter. Additional Flomax sent to your pharmacy. In addition I did send prescription for Zofran ODT. I have sent prescription for pain medicine as well as anti-inflammatory to your pharmacy.

## 2023-09-11 NOTE — ED TRIAGE NOTES
Patient to ED for left testicle pain. Patient is alert and in obvious discomfort. Patient complaints of onset of 1 hour PTA, rates a \"9\" and is \"sharp\" nature. Concern for possible torsion and provider to room. No other complaints at this time.

## 2024-05-07 ENCOUNTER — TELEPHONE (OUTPATIENT)
Dept: INTERNAL MEDICINE CLINIC | Age: 29
End: 2024-05-07